# Patient Record
Sex: FEMALE | Race: WHITE | NOT HISPANIC OR LATINO | Employment: OTHER | URBAN - METROPOLITAN AREA
[De-identification: names, ages, dates, MRNs, and addresses within clinical notes are randomized per-mention and may not be internally consistent; named-entity substitution may affect disease eponyms.]

---

## 2017-03-18 ENCOUNTER — HOSPITAL ENCOUNTER (INPATIENT)
Facility: MEDICAL CENTER | Age: 82
LOS: 4 days | DRG: 417 | End: 2017-03-22
Attending: EMERGENCY MEDICINE | Admitting: INTERNAL MEDICINE
Payer: COMMERCIAL

## 2017-03-18 ENCOUNTER — APPOINTMENT (OUTPATIENT)
Dept: RADIOLOGY | Facility: MEDICAL CENTER | Age: 82
DRG: 417 | End: 2017-03-18
Attending: EMERGENCY MEDICINE
Payer: COMMERCIAL

## 2017-03-18 ENCOUNTER — RESOLUTE PROFESSIONAL BILLING HOSPITAL PROF FEE (OUTPATIENT)
Dept: HOSPITALIST | Facility: MEDICAL CENTER | Age: 82
End: 2017-03-18
Payer: COMMERCIAL

## 2017-03-18 DIAGNOSIS — K85.10 ACUTE GALLSTONE PANCREATITIS: ICD-10-CM

## 2017-03-18 PROBLEM — K80.50 CHOLEDOCHOLITHIASIS: Status: ACTIVE | Noted: 2017-03-18

## 2017-03-18 LAB
ALBUMIN SERPL BCP-MCNC: 4.8 G/DL (ref 3.2–4.9)
ALBUMIN/GLOB SERPL: 1.5 G/DL
ALP SERPL-CCNC: 103 U/L (ref 30–99)
ALT SERPL-CCNC: 218 U/L (ref 2–50)
ANION GAP SERPL CALC-SCNC: 12 MMOL/L (ref 0–11.9)
APPEARANCE UR: CLEAR
AST SERPL-CCNC: 414 U/L (ref 12–45)
BASOPHILS # BLD AUTO: 0.4 % (ref 0–1.8)
BASOPHILS # BLD: 0.06 K/UL (ref 0–0.12)
BILIRUB SERPL-MCNC: 1.5 MG/DL (ref 0.1–1.5)
BILIRUB UR QL STRIP.AUTO: NEGATIVE
BUN SERPL-MCNC: 34 MG/DL (ref 8–22)
CALCIUM SERPL-MCNC: 9.5 MG/DL (ref 8.4–10.2)
CHLORIDE SERPL-SCNC: 102 MMOL/L (ref 96–112)
CO2 SERPL-SCNC: 24 MMOL/L (ref 20–33)
COLOR UR: YELLOW
CREAT SERPL-MCNC: 1 MG/DL (ref 0.5–1.4)
CULTURE IF INDICATED INDCX: NO UA CULTURE
EOSINOPHIL # BLD AUTO: 0.03 K/UL (ref 0–0.51)
EOSINOPHIL NFR BLD: 0.2 % (ref 0–6.9)
ERYTHROCYTE [DISTWIDTH] IN BLOOD BY AUTOMATED COUNT: 40.6 FL (ref 35.9–50)
GFR SERPL CREATININE-BSD FRML MDRD: 53 ML/MIN/1.73 M 2
GLOBULIN SER CALC-MCNC: 3.2 G/DL (ref 1.9–3.5)
GLUCOSE SERPL-MCNC: 177 MG/DL (ref 65–99)
GLUCOSE UR STRIP.AUTO-MCNC: NEGATIVE MG/DL
HCT VFR BLD AUTO: 42.2 % (ref 37–47)
HGB BLD-MCNC: 14.5 G/DL (ref 12–16)
IMM GRANULOCYTES # BLD AUTO: 0.05 K/UL (ref 0–0.11)
IMM GRANULOCYTES NFR BLD AUTO: 0.3 % (ref 0–0.9)
KETONES UR STRIP.AUTO-MCNC: NEGATIVE MG/DL
LEUKOCYTE ESTERASE UR QL STRIP.AUTO: NEGATIVE
LIPASE SERPL-CCNC: >400 U/L (ref 7–58)
LYMPHOCYTES # BLD AUTO: 1.31 K/UL (ref 1–4.8)
LYMPHOCYTES NFR BLD: 8.9 % (ref 22–41)
MCH RBC QN AUTO: 29.7 PG (ref 27–33)
MCHC RBC AUTO-ENTMCNC: 34.4 G/DL (ref 33.6–35)
MCV RBC AUTO: 86.3 FL (ref 81.4–97.8)
MICRO URNS: NORMAL
MONOCYTES # BLD AUTO: 0.72 K/UL (ref 0–0.85)
MONOCYTES NFR BLD AUTO: 4.9 % (ref 0–13.4)
NEUTROPHILS # BLD AUTO: 12.61 K/UL (ref 2–7.15)
NEUTROPHILS NFR BLD: 85.3 % (ref 44–72)
NITRITE UR QL STRIP.AUTO: NEGATIVE
NRBC # BLD AUTO: 0 K/UL
NRBC BLD AUTO-RTO: 0 /100 WBC
PH UR STRIP.AUTO: 5 [PH]
PLATELET # BLD AUTO: 220 K/UL (ref 164–446)
PMV BLD AUTO: 9.8 FL (ref 9–12.9)
POTASSIUM SERPL-SCNC: 3.3 MMOL/L (ref 3.6–5.5)
PROT SERPL-MCNC: 8 G/DL (ref 6–8.2)
PROT UR QL STRIP: NEGATIVE MG/DL
RBC # BLD AUTO: 4.89 M/UL (ref 4.2–5.4)
RBC UR QL AUTO: NEGATIVE
SODIUM SERPL-SCNC: 138 MMOL/L (ref 135–145)
SP GR UR STRIP.AUTO: 1.02
WBC # BLD AUTO: 14.8 K/UL (ref 4.8–10.8)

## 2017-03-18 PROCEDURE — 99223 1ST HOSP IP/OBS HIGH 75: CPT | Performed by: INTERNAL MEDICINE

## 2017-03-18 PROCEDURE — 700111 HCHG RX REV CODE 636 W/ 250 OVERRIDE (IP): Performed by: INTERNAL MEDICINE

## 2017-03-18 PROCEDURE — 76705 ECHO EXAM OF ABDOMEN: CPT

## 2017-03-18 PROCEDURE — 99285 EMERGENCY DEPT VISIT HI MDM: CPT

## 2017-03-18 PROCEDURE — 83690 ASSAY OF LIPASE: CPT

## 2017-03-18 PROCEDURE — 80053 COMPREHEN METABOLIC PANEL: CPT

## 2017-03-18 PROCEDURE — 85025 COMPLETE CBC W/AUTO DIFF WBC: CPT

## 2017-03-18 PROCEDURE — 94760 N-INVAS EAR/PLS OXIMETRY 1: CPT

## 2017-03-18 PROCEDURE — 770006 HCHG ROOM/CARE - MED/SURG/GYN SEMI*

## 2017-03-18 PROCEDURE — 83036 HEMOGLOBIN GLYCOSYLATED A1C: CPT

## 2017-03-18 PROCEDURE — 700105 HCHG RX REV CODE 258: Performed by: EMERGENCY MEDICINE

## 2017-03-18 PROCEDURE — 96361 HYDRATE IV INFUSION ADD-ON: CPT

## 2017-03-18 PROCEDURE — 700111 HCHG RX REV CODE 636 W/ 250 OVERRIDE (IP): Performed by: EMERGENCY MEDICINE

## 2017-03-18 PROCEDURE — 700102 HCHG RX REV CODE 250 W/ 637 OVERRIDE(OP): Performed by: INTERNAL MEDICINE

## 2017-03-18 PROCEDURE — A9270 NON-COVERED ITEM OR SERVICE: HCPCS | Performed by: INTERNAL MEDICINE

## 2017-03-18 PROCEDURE — 700101 HCHG RX REV CODE 250: Performed by: INTERNAL MEDICINE

## 2017-03-18 PROCEDURE — 81003 URINALYSIS AUTO W/O SCOPE: CPT

## 2017-03-18 PROCEDURE — 96375 TX/PRO/DX INJ NEW DRUG ADDON: CPT

## 2017-03-18 PROCEDURE — 96374 THER/PROPH/DIAG INJ IV PUSH: CPT

## 2017-03-18 RX ORDER — ENALAPRILAT 1.25 MG/ML
1.25 INJECTION INTRAVENOUS EVERY 6 HOURS PRN
Status: DISCONTINUED | OUTPATIENT
Start: 2017-03-18 | End: 2017-03-22 | Stop reason: HOSPADM

## 2017-03-18 RX ORDER — HYDROCHLOROTHIAZIDE 25 MG/1
12.5 TABLET ORAL
Status: DISCONTINUED | OUTPATIENT
Start: 2017-03-19 | End: 2017-03-22 | Stop reason: HOSPADM

## 2017-03-18 RX ORDER — OXYCODONE HYDROCHLORIDE 5 MG/1
5 TABLET ORAL
Status: DISCONTINUED | OUTPATIENT
Start: 2017-03-18 | End: 2017-03-22 | Stop reason: HOSPADM

## 2017-03-18 RX ORDER — LORAZEPAM 2 MG/ML
0.5 INJECTION INTRAMUSCULAR EVERY 6 HOURS PRN
Status: DISCONTINUED | OUTPATIENT
Start: 2017-03-18 | End: 2017-03-22 | Stop reason: HOSPADM

## 2017-03-18 RX ORDER — LABETALOL HYDROCHLORIDE 5 MG/ML
20 INJECTION, SOLUTION INTRAVENOUS EVERY 4 HOURS PRN
Status: DISCONTINUED | OUTPATIENT
Start: 2017-03-18 | End: 2017-03-22 | Stop reason: HOSPADM

## 2017-03-18 RX ORDER — LOSARTAN POTASSIUM AND HYDROCHLOROTHIAZIDE 12.5; 5 MG/1; MG/1
1 TABLET ORAL DAILY
COMMUNITY

## 2017-03-18 RX ORDER — ACETAMINOPHEN 325 MG/1
650 TABLET ORAL EVERY 6 HOURS PRN
Status: DISCONTINUED | OUTPATIENT
Start: 2017-03-18 | End: 2017-03-22 | Stop reason: HOSPADM

## 2017-03-18 RX ORDER — LORAZEPAM 0.5 MG/1
0.5 TABLET ORAL EVERY 6 HOURS PRN
Status: DISCONTINUED | OUTPATIENT
Start: 2017-03-18 | End: 2017-03-22 | Stop reason: HOSPADM

## 2017-03-18 RX ORDER — ESOMEPRAZOLE MAGNESIUM 20 MG/1
1 TABLET, DELAYED RELEASE ORAL DAILY
COMMUNITY

## 2017-03-18 RX ORDER — ONDANSETRON 2 MG/ML
4 INJECTION INTRAMUSCULAR; INTRAVENOUS ONCE
Status: COMPLETED | OUTPATIENT
Start: 2017-03-18 | End: 2017-03-18

## 2017-03-18 RX ORDER — SODIUM CHLORIDE AND POTASSIUM CHLORIDE 150; 900 MG/100ML; MG/100ML
INJECTION, SOLUTION INTRAVENOUS CONTINUOUS
Status: DISCONTINUED | OUTPATIENT
Start: 2017-03-18 | End: 2017-03-19

## 2017-03-18 RX ORDER — OXYCODONE HYDROCHLORIDE 5 MG/1
2.5 TABLET ORAL
Status: DISCONTINUED | OUTPATIENT
Start: 2017-03-18 | End: 2017-03-22 | Stop reason: HOSPADM

## 2017-03-18 RX ORDER — MORPHINE SULFATE 4 MG/ML
2 INJECTION, SOLUTION INTRAMUSCULAR; INTRAVENOUS
Status: DISCONTINUED | OUTPATIENT
Start: 2017-03-18 | End: 2017-03-22 | Stop reason: HOSPADM

## 2017-03-18 RX ORDER — LOSARTAN POTASSIUM 25 MG/1
50 TABLET ORAL
Status: DISCONTINUED | OUTPATIENT
Start: 2017-03-19 | End: 2017-03-22 | Stop reason: HOSPADM

## 2017-03-18 RX ORDER — SODIUM CHLORIDE 9 MG/ML
1000 INJECTION, SOLUTION INTRAVENOUS ONCE
Status: COMPLETED | OUTPATIENT
Start: 2017-03-18 | End: 2017-03-18

## 2017-03-18 RX ORDER — FERROUS SULFATE 325(65) MG
325 TABLET ORAL EVERY EVENING
COMMUNITY

## 2017-03-18 RX ORDER — ONDANSETRON 4 MG/1
4 TABLET, ORALLY DISINTEGRATING ORAL EVERY 4 HOURS PRN
Status: DISCONTINUED | OUTPATIENT
Start: 2017-03-18 | End: 2017-03-22 | Stop reason: HOSPADM

## 2017-03-18 RX ORDER — FERROUS SULFATE 325(65) MG
325 TABLET ORAL EVERY EVENING
Status: DISCONTINUED | OUTPATIENT
Start: 2017-03-18 | End: 2017-03-22 | Stop reason: HOSPADM

## 2017-03-18 RX ORDER — LOSARTAN POTASSIUM AND HYDROCHLOROTHIAZIDE 12.5; 5 MG/1; MG/1
1 TABLET ORAL DAILY
Status: DISCONTINUED | OUTPATIENT
Start: 2017-03-18 | End: 2017-03-18

## 2017-03-18 RX ORDER — CIPROFLOXACIN 2 MG/ML
400 INJECTION, SOLUTION INTRAVENOUS EVERY 12 HOURS
Status: DISCONTINUED | OUTPATIENT
Start: 2017-03-18 | End: 2017-03-22 | Stop reason: HOSPADM

## 2017-03-18 RX ORDER — ONDANSETRON 2 MG/ML
4 INJECTION INTRAMUSCULAR; INTRAVENOUS EVERY 4 HOURS PRN
Status: DISCONTINUED | OUTPATIENT
Start: 2017-03-18 | End: 2017-03-22 | Stop reason: HOSPADM

## 2017-03-18 RX ORDER — OMEPRAZOLE 20 MG/1
20 CAPSULE, DELAYED RELEASE ORAL DAILY
Status: DISCONTINUED | OUTPATIENT
Start: 2017-03-19 | End: 2017-03-22 | Stop reason: HOSPADM

## 2017-03-18 RX ORDER — FEXOFENADINE HCL 60 MG/1
180 TABLET, FILM COATED ORAL DAILY
Status: DISCONTINUED | OUTPATIENT
Start: 2017-03-19 | End: 2017-03-22 | Stop reason: HOSPADM

## 2017-03-18 RX ORDER — MORPHINE SULFATE 4 MG/ML
2 INJECTION, SOLUTION INTRAMUSCULAR; INTRAVENOUS ONCE
Status: COMPLETED | OUTPATIENT
Start: 2017-03-18 | End: 2017-03-18

## 2017-03-18 RX ORDER — FEXOFENADINE HCL 180 MG/1
180 TABLET ORAL DAILY
COMMUNITY

## 2017-03-18 RX ORDER — FUROSEMIDE 20 MG/1
20 TABLET ORAL
COMMUNITY

## 2017-03-18 RX ADMIN — POTASSIUM CHLORIDE AND SODIUM CHLORIDE: 900; 150 INJECTION, SOLUTION INTRAVENOUS at 18:31

## 2017-03-18 RX ADMIN — ONDANSETRON 4 MG: 2 INJECTION, SOLUTION INTRAMUSCULAR; INTRAVENOUS at 15:09

## 2017-03-18 RX ADMIN — MORPHINE SULFATE 2 MG: 4 INJECTION INTRAVENOUS at 15:08

## 2017-03-18 RX ADMIN — FERROUS SULFATE TAB 325 MG (65 MG ELEMENTAL FE) 325 MG: 325 (65 FE) TAB at 23:07

## 2017-03-18 RX ADMIN — CIPROFLOXACIN 400 MG: 2 INJECTION, SOLUTION INTRAVENOUS at 19:29

## 2017-03-18 RX ADMIN — METRONIDAZOLE 500 MG: 500 INJECTION, SOLUTION INTRAVENOUS at 23:07

## 2017-03-18 RX ADMIN — SODIUM CHLORIDE 1000 ML: 9 INJECTION, SOLUTION INTRAVENOUS at 15:08

## 2017-03-18 RX ADMIN — METOPROLOL TARTRATE 12.5 MG: 25 TABLET ORAL at 23:07

## 2017-03-18 ASSESSMENT — LIFESTYLE VARIABLES
EVER_SMOKED: YES
EVER_SMOKED: YES
ALCOHOL_USE: NO

## 2017-03-18 ASSESSMENT — PAIN SCALES - GENERAL
PAINLEVEL_OUTOF10: 5
PAINLEVEL_OUTOF10: 10

## 2017-03-18 NOTE — IP AVS SNAPSHOT
" <p align=\"LEFT\"><IMG SRC=\"//[email or web address]$/[email or web address]\" alt=\"Image\" WIDTH=\"50%\" HEIGHT=\"200\" BORDER=\"\"></p>                   Name:Kathy Rosen  Medical Record Number:[id number]  CSN: [id number]    YOB: 1931   Age: 85 y.o.  Sex: female  HT:1.473 m (4' 9.99\") WT: 57.6 kg (126 lb 15.8 oz)          Admit Date: 3/18/2017     Discharge Date:   Today's Date: 3/22/2017  Attending Doctor:  Pavan Ascencio M.D.                  Allergies:  Penicillins          Follow-up Information     1. Follow up with Chrissie Patel M.D. In 1 week.    Specialty:  Surgery    Contact information    [address]  [address]  [facility] [address] [phone]           Medication List      Take these Medications        Instructions    ALLEGRA ALLERGY 180 MG tablet   Generic drug:  fexofenadine    Take 180 mg by mouth every day.   Dose:  180 mg       Esomeprazole Magnesium 20 MG Tbec    Take 1 Tab by mouth every day.   Dose:  1 Tab       ferrous sulfate 325 (65 FE) MG tablet    Take 325 mg by mouth every evening.   Dose:  325 mg       furosemide 20 MG Tabs   Commonly known as:  LASIX    Take 20 mg by mouth every 48 hours.   Dose:  20 mg       losartan-hydrochlorothiazide 50-12.5 MG per tablet   Commonly known as:  HYZAAR    Take 1 Tab by mouth every day.   Dose:  1 Tab       metoprolol 25 MG Tabs   Commonly known as:  LOPRESSOR    Take 12.5 mg by mouth 2 times a day.   Dose:  12.5 mg       ondansetron 4 MG Tbdp   Commonly known as:  ZOFRAN ODT    Take 1 Tab by mouth every four hours as needed for Nausea/Vomiting (give PO if IV route is unavailable. May give per feeding tube.).   Dose:  4 mg       oxycodone immediate-release 5 MG Tabs   Commonly known as:  ROXICODONE    Take 1 Tab by mouth every 6 hours as needed (Severe Pain (NRS Pain Scale 7-10; CPOT Pain Scale 6-8)).   Dose:  5 mg         "

## 2017-03-18 NOTE — ED NOTES
The Medication Reconciliation process has been completed by interviewing the patient    Allergies have been reviewed  Antibiotic use in 30 days - NONE    Home Pharmacy:  [facility] [place]

## 2017-03-18 NOTE — IP AVS SNAPSHOT
3/22/2017          Kathy Jessika  [address] [place] [address]    Dear Kathy:    [facility] wants to ensure your discharge home is safe and you or your loved ones have had all your questions answered regarding your care after you leave the hospital.    You may receive a telephone call within two days of your discharge.  This call is to make certain you understand your discharge instructions as well as ensure we provided you with the best care possible during your stay with us.     The call will only last approximately 3-5 minutes and will be done by a nurse.    Once again, we want to ensure your discharge home is safe and that you have a clear understanding of any next steps in your care.  If you have any questions or concerns, please do not hesitate to contact us, we are here for you.  Thank you for choosing [facility] for your healthcare needs.    Sincerely,    Manish Mills    [facility]

## 2017-03-18 NOTE — ED PROVIDER NOTES
"ED Provider Note    CHIEF COMPLAINT  Chief Complaint   Patient presents with   • Abdominal Pain   • N/V       HPI  Kathy Rosen is a 85 y.o. female who presents stating that she abruptly had onset of right upper quadrant and epigastric discomfort radiating to her back approximately 10 AM which has remained moderate in severity with no evidence of fever, chills, sweats, lower quadrant pain. She has had multiple episodes of nausea and vomiting. She last ate just before onset of pain. She denies any other complaints and has a history of appendectomy in the remote past but no other abdominal surgeries.    REVIEW OF SYSTEMS  See HPI for further details. All other systems are negative.     PAST MEDICAL HISTORY  Past Medical History   Diagnosis Date   • Hypertension    • GERD (gastroesophageal reflux disease)        FAMILY HISTORY  History reviewed. No pertinent family history.    SOCIAL HISTORY   reports that she has quit smoking. She has never used smokeless tobacco. She reports that she does not drink alcohol or use illicit drugs.    SURGICAL HISTORY  Past Surgical History   Procedure Laterality Date   • Appendectomy         CURRENT MEDICATIONS  Home Medications     Reviewed by Jayda Parham (Pharmacy Tech) on 03/18/17 at 1521  Med List Status: Complete    Medication Last Dose Status    Esomeprazole Magnesium 20 MG Tablet Delayed Response 3/18/2017 Active    ferrous sulfate 325 (65 FE) MG tablet 3/18/2017 Active    fexofenadine (ALLEGRA ALLERGY) 180 MG tablet 3/18/2017 Active    furosemide (LASIX) 20 MG Tab 3/17/2017 Active    losartan-hydrochlorothiazide (HYZAAR) 50-12.5 MG per tablet 3/18/2017 Active    metoprolol (LOPRESSOR) 25 MG Tab 3/18/2017 Active                ALLERGIES  Allergies   Allergen Reactions   • Penicillins Hives     Rxn - 2016       PHYSICAL EXAM  VITAL SIGNS: /67 mmHg  Pulse 77  Temp(Src) 37.1 °C (98.8 °F)  Resp 18  Ht 1.473 m (4' 10\")  Wt 57.6 kg (126 lb 15.8 oz)  BMI " 26.55 kg/m2  SpO2 96%   Constitutional: Well developed, Well nourished, No acute distress, Non-toxic appearance.   HENT: Normocephalic, Atraumatic, Bilateral external ears normal, Oropharynx is clear mucous membranes are moist. No oral exudates or nasal discharge.   Eyes: Pupils are equal round and reactive, EOMI, Conjunctiva normal, No discharge.   Neck: Normal range of motion, No tenderness, Supple, No stridor. No meningismus.  Lymphatic: No lymphadenopathy noted.   Cardiovascular: Regular rate and rhythm without murmur rub or gallop.  Thorax & Lungs: Clear breath sounds bilaterally without wheezes, rhonchi or rales. There is no chest wall tenderness.   Abdomen: Soft with tenderness of the right upper quadrant, positive Bose sign, the abdomen is otherwise scaphoid and non-distended. There is no rebound or guarding. No organomegaly is appreciated. Bowel sounds are normal.  Skin: Normal without rash.   Back: No CVA or spinal tenderness.   Extremities: Intact distal pulses, No edema, No tenderness, No cyanosis, No clubbing. Capillary refill is less than 2 seconds.  Musculoskeletal: Good range of motion in all major joints. No tenderness to palpation or major deformities noted.   Neurologic: Alert & oriented x 3, Normal motor function, Normal sensory function, No focal deficits noted. Reflexes are normal.  Psychiatric: Affect normal, Judgment normal, Mood normal. There is no suicidal ideation or patient reported hallucinations.       RADIOLOGY/PROCEDURES  US-GALLBLADDER   Final Result      1.  Cholelithiasis      2.  Biliary dilation which could indicate choledocholithiasis      3.  No other finding               COURSE & MEDICAL DECISION MAKING  Pertinent Labs & Imaging studies reviewed. (See chart for details)  Patient presents with significant right upper quadrant tenderness and was concerned about acute choledocholithiasis versus cholecystitis versus gastritis.    Laboratory evaluation reveals a normal  urinalysis with no evidence of infection. She does have elevation of white blood cell count 14,800 with 85% neutrophil shift. The patient's serum electrolytes show BUN elevated at 34 and I hydrated her with normal saline. She does have elevated liver enzymes and notably a lipase over 400 and clinically I think she has gallstone pancreatitis    I spoke Dr. Gutierrez and he will see the patient tomorrow morning for ERCP. I spoke with Dr. Peres who is admitting the patient in stable condition for pain control, management of symptoms and preparation for this procedure. Patient is informed of her status and her daughter translates to her in Israeli    FINAL IMPRESSION  1. Acute gallstone pancreatitis             Electronically signed by: Guzman Charles, 3/18/2017 4:04 PM

## 2017-03-18 NOTE — IP AVS SNAPSHOT
" Home Care Instructions                                                                                                                  Name:Kathy Rosen  Medical Record Number:[id number]  CSN: [id number]    YOB: 1931   Age: 85 y.o.  Sex: female  HT:1.473 m (4' 9.99\") WT: 57.6 kg (126 lb 15.8 oz)          Admit Date: 3/18/2017     Discharge Date:   Today's Date: 3/22/2017  Attending Doctor:  Pavan Ascencio M.D.                  Allergies:  Penicillins            Discharge Instructions       Discharge Instructions    Discharged to home by car with relative. Discharged via wheelchair, hospital escort: Yes.  Special equipment needed: Not Applicable    Be sure to schedule a follow-up appointment with your primary care doctor or any specialists as instructed.     Discharge Plan:   Influenza Vaccine Indication: Not indicated: Previously immunized this influenza season and > 8 years of age    I understand that a diet low in cholesterol, fat, and sodium is recommended for good health. Unless I have been given specific instructions below for another diet, I accept this instruction as my diet prescription.     Special Instructions:     Laparoscopic Cholecystectomy discharge instructions:     DIET: Upon discharge from the hospital you may resume your normal preoperative diet. Depending on how you are feeling and whether you have nausea or not, you may wish to stay with a bland diet for the first few days. However, you can advance this as quickly as you feel ready.     ACTIVITIES: After discharge from the hospital, you may resume full routine activities. However, there should be no heavy lifting (greater than 15 pounds) and no strenuous activities until after your follow-up visit. Otherwise, routine activities of daily living are acceptable.     DRIVING: You may drive whenever you are off pain medications and are able to perform the activities needed to drive, i.e. turning, bending, twisting, etc.     BATHING: You " may get the wound wet at any time after leaving the hospital. You may shower, but do not submerge in a bath for at least a week. Dressings may come off after 48 hours.     BOWEL FUNCTION: A few patients, after this operation, will develop either frequent or loose stools after meals. This usually corrects itself after a few days, to a few weeks. If this occurs, do not worry; it is not unusual and will resolve. Much more common than loose stools, is constipation. The combination of pain medication and decreased activity level can cause constipation in otherwise normal patients. If you feel this is occurring, take a laxative (Milk of Magnesia, Ex-Lax, Senokot, etc.) until the problem has resolved.     PAIN MEDICATION: You will be given a prescription for pain medication at discharge. Please take these as directed. It is important to remember not to take medications on an empty stomach as this may cause nausea.     CALL IF YOU HAVE: (1) Fevers to more than 1010 F, (2) Unusual chest or leg pain, (3) Drainage or fluid from incision that may be foul smelling, increased tenderness or soreness at the wound or the wound edges are no longer together, redness or swelling at the incision site. Please do not hesitate to call with any other questions.     APPOINTMENT: Contact our office at [phone] for a follow-up appointment in 1 to 2 weeks following your procedure.     If you have any additional questions, please do not hesitate to call the office.     Office address:   [address] [place] [address], [address] [place] [address]    · Is patient discharged on Warfarin / Coumadin?   No     · Is patient Post Blood Transfusion?  No    Depression / Suicide Risk    As you are discharged from this [facility] facility, it is important to learn how to keep safe from harming yourself.    Recognize the warning signs:  · Abrupt changes in personality, positive or negative- including increase in energy   · Giving away possessions  · Change in eating  patterns- significant weight changes-  positive or negative  · Change in sleeping patterns- unable to sleep or sleeping all the time   · Unwillingness or inability to communicate  · Depression  · Unusual sadness, discouragement and loneliness  · Talk of wanting to die  · Neglect of personal appearance   · Rebelliousness- reckless behavior  · Withdrawal from people/activities they love  · Confusion- inability to concentrate     If you or a loved one observes any of these behaviors or has concerns about self-harm, here's what you can do:  · Talk about it- your feelings and reasons for harming yourself  · Remove any means that you might use to hurt yourself (examples: pills, rope, extension cords, firearm)  · Get professional help from the community (Mental Health, Substance Abuse, psychological counseling)  · Do not be alone:Call your Safe Contact- someone whom you trust who will be there for you.  · Call your local CRISIS HOTLINE 013-9215 or 309-076-8473  · Call your local Children's Mobile Crisis Response Team [place] (860) 049-8685 or wwwBriteseed  · Call the toll free National Suicide Prevention Hotlines   · National Suicide Prevention Lifeline 356-187-BFIM (8393)  · National Hope Line Network 800-SUICIDE (881-7023)        Follow-up Information     1. Follow up with Chrissie Patel M.D. In 1 week.    Specialty:  Surgery    Contact information    [address]  [address]  [place] [address] [phone]           Discharge Medication Instructions:    Below are the medications your physician expects you to take upon discharge:    Review all your home medications and newly ordered medications with your doctor and/or pharmacist. Follow medication instructions as directed by your doctor and/or pharmacist.    Please keep your medication list with you and share with your physician.               Medication List      START taking these medications        Instructions    ondansetron 4 MG Tbdp   Commonly  known as:  ZOFRAN ODT    Take 1 Tab by mouth every four hours as needed for Nausea/Vomiting (give PO if IV route is unavailable. May give per feeding tube.).   Dose:  4 mg       oxycodone immediate-release 5 MG Tabs   Commonly known as:  ROXICODONE    Take 1 Tab by mouth every 6 hours as needed (Severe Pain (NRS Pain Scale 7-10; CPOT Pain Scale 6-8)).   Dose:  5 mg         CONTINUE taking these medications        Instructions    ALLEGRA ALLERGY 180 MG tablet   Last time this was given:  180 mg on 3/22/2017  8:53 AM   Generic drug:  fexofenadine    Take 180 mg by mouth every day.   Dose:  180 mg       Esomeprazole Magnesium 20 MG Tbec    Take 1 Tab by mouth every day.   Dose:  1 Tab       ferrous sulfate 325 (65 FE) MG tablet   Last time this was given:  325 mg on 3/21/2017  8:00 PM    Take 325 mg by mouth every evening.   Dose:  325 mg       furosemide 20 MG Tabs   Commonly known as:  LASIX    Take 20 mg by mouth every 48 hours.   Dose:  20 mg       losartan-hydrochlorothiazide 50-12.5 MG per tablet   Commonly known as:  HYZAAR    Take 1 Tab by mouth every day.   Dose:  1 Tab       metoprolol 25 MG Tabs   Last time this was given:  12.5 mg on 3/22/2017  8:54 AM   Commonly known as:  LOPRESSOR    Take 12.5 mg by mouth 2 times a day.   Dose:  12.5 mg               Instructions           Diet / Nutrition:    Follow any diet instructions given to you by your doctor or the dietician, including how much salt (sodium) you are allowed each day.    If you are overweight, talk to your doctor about a weight reduction plan.    Activity:    Remain physically active following your doctor's instructions about exercise and activity.    Rest often.     Any time you become even a little tired or short of breath, SIT DOWN and rest.    Worsening Symptoms:    Report any of the following signs and symptoms to the doctor's office immediately:    *Pain of jaw, arm, or neck  *Chest pain not relieved by medication                                *Dizziness or loss of consciousness  *Difficulty breathing even when at rest   *More tired than usual                                       *Bleeding drainage or swelling of surgical site  *Swelling of feet, ankles, legs or stomach                 *Fever (>100ºF)  *Pink or blood tinged sputum  *Weight gain (3lbs/day or 5lbs /week)           *Shock from internal defibrillator (if applicable)  *Palpitations or irregular heartbeats                *Cool and/or numb extremities    Stroke Awareness    Common Risk Factors for Stroke include:    Age  Atrial Fibrillation  Carotid Artery Stenosis  Diabetes Mellitus  Excessive alcohol consumption  High blood pressure  Overweight   Physical inactivity  Smoking    Warning signs and symptoms of a stroke include:    *Sudden numbness or weakness of the face, arm or leg (especially on one side of the body).  *Sudden confusion, trouble speaking or understanding.  *Sudden trouble seeing in one or both eyes.  *Sudden trouble walking, dizziness, loss of balance or coordination.Sudden severe headache with no known cause.    It is very important to get treatment quickly when a stroke occurs. If you experience any of the above warning signs, call 911 immediately.                   Disclaimer         Quit Smoking / Tobacco Use:    I understand the use of any tobacco products increases my chance of suffering from future heart disease or stroke and could cause other illnesses which may shorten my life. Quitting the use of tobacco products is the single most important thing I can do to improve my health. For further information on smoking / tobacco cessation call a Toll Free Quit Line at 1-298.632.3900 (*National Cancer Likely) or 1-149.231.5938 (American Lung Association) or you can access the web based program at www.lungusa.org.    [place] Tobacco Users Help Line:  (928) 613-8985       Toll Free: 1-461.134.1740  Quit Tobacco Program [facility]  (252) 209-4709    Crisis Hotline:    [place] Crisis Hotline:  1-939-ULDWAWY or 1-620.285.7595    [place] Crisis Hotline:    1-810.647.5007 or 519-673-3069    Discharge Survey:   Thank you for choosing [facility]. We hope we did everything we could to make your hospital stay a pleasant one. You may be receiving a phone survey and we would appreciate your time and participation in answering the questions. Your input is very valuable to us in our efforts to improve our service to our patients and their families.        My signature on this form indicates that:    1. I have reviewed and understand the above information.  2. My questions regarding this information have been answered to my satisfaction.  3. I have formulated a plan with my discharge nurse to obtain my prescribed medications for home.                  Disclaimer         __________________________________                     __________       ________                       Patient Signature                                                 Date                    Time

## 2017-03-18 NOTE — IP AVS SNAPSHOT
Solaicx Access Code: [email or web address]  Expires: 4/21/2017 10:20 AM    Your email address is not on file at [employer] [employer].  Email Addresses are required for you to sign up for Solaicx, please contact [phone] to verify your personal information and to provide your email address prior to attempting to register for Solaicx.    Kathy Rosen  [address] [place] [address]    Solaicx  A secure, online tool to manage your health information     [employer]’s Solaicx® is a secure, online tool that connects you to your personalized health information from the privacy of your home -- day or night - making it very easy for you to manage your healthcare. Once the activation process is completed, you can even access your medical information using the Solaicx faraz, which is available for free in the Apple Faraz store or Google Play store.     To learn more about Solaicx, visit [email or web address]    There are two levels of access available (as shown below):   My Chart Features  [facility] Primary Care Doctor [facility]  Specialists [facility]  Urgent  Care Non-[facility] Primary Care Doctor   Email your healthcare team securely and privately 24/7 X X X    Manage appointments: schedule your next appointment; view details of past/upcoming appointments X      Request prescription refills. X      View recent personal medical records, including lab and immunizations X X X X   View health record, including health history, allergies, medications X X X X   Read reports about your outpatient visits, procedures, consult and ER notes X X X X   See your discharge summary, which is a recap of your hospital and/or ER visit that includes your diagnosis, lab results, and care plan X X  X     How to register for The Point[employer]:  Once your e-mail address has been verified, follow the following steps to sign up for Solaicx.     1. Go to  [email or web address]  2. Click on the Sign Up Now box, which takes you to the New Member Sign Up page. You  will need to provide the following information:  a. Enter your [employer] Access Code exactly as it appears at the top of this page. (You will not need to use this code after you’ve completed the sign-up process. If you do not sign up before the expiration date, you must request a new code.)   b. Enter your date of birth.   c. Enter your home email address.   d. Click Submit, and follow the next screen’s instructions.  3. Create a [employer] ID. This will be your [employer] login ID and cannot be changed, so think of one that is secure and easy to remember.  4. Create a [employer] password. You can change your password at any time.  5. Enter your Password Reset Question and Answer. This can be used at a later time if you forget your password.   6. Enter your e-mail address. This allows you to receive e-mail notifications when new information is available in [employer]  7. Click Sign Up. You can now view your health information.    For assistance activating your [employer] account, call [phone]

## 2017-03-19 LAB
ALBUMIN SERPL BCP-MCNC: 3.2 G/DL (ref 3.2–4.9)
ALBUMIN/GLOB SERPL: 1.3 G/DL
ALP SERPL-CCNC: 77 U/L (ref 30–99)
ALT SERPL-CCNC: 155 U/L (ref 2–50)
ANION GAP SERPL CALC-SCNC: 6 MMOL/L (ref 0–11.9)
AST SERPL-CCNC: 112 U/L (ref 12–45)
BASOPHILS # BLD AUTO: 0.2 % (ref 0–1.8)
BASOPHILS # BLD: 0.02 K/UL (ref 0–0.12)
BILIRUB SERPL-MCNC: 0.7 MG/DL (ref 0.1–1.5)
BUN SERPL-MCNC: 20 MG/DL (ref 8–22)
CALCIUM SERPL-MCNC: 8.1 MG/DL (ref 8.4–10.2)
CHLORIDE SERPL-SCNC: 111 MMOL/L (ref 96–112)
CO2 SERPL-SCNC: 25 MMOL/L (ref 20–33)
CREAT SERPL-MCNC: 0.87 MG/DL (ref 0.5–1.4)
EOSINOPHIL # BLD AUTO: 0.05 K/UL (ref 0–0.51)
EOSINOPHIL NFR BLD: 0.5 % (ref 0–6.9)
ERYTHROCYTE [DISTWIDTH] IN BLOOD BY AUTOMATED COUNT: 43.6 FL (ref 35.9–50)
EST. AVERAGE GLUCOSE BLD GHB EST-MCNC: 105 MG/DL
GFR SERPL CREATININE-BSD FRML MDRD: >60 ML/MIN/1.73 M 2
GLOBULIN SER CALC-MCNC: 2.5 G/DL (ref 1.9–3.5)
GLUCOSE SERPL-MCNC: 94 MG/DL (ref 65–99)
HBA1C MFR BLD: 5.3 % (ref 0–5.6)
HCT VFR BLD AUTO: 36 % (ref 37–47)
HGB BLD-MCNC: 12.2 G/DL (ref 12–16)
IMM GRANULOCYTES # BLD AUTO: 0.04 K/UL (ref 0–0.11)
IMM GRANULOCYTES NFR BLD AUTO: 0.4 % (ref 0–0.9)
INR PPP: 1.1 (ref 0.87–1.13)
LIPASE SERPL-CCNC: >400 U/L (ref 7–58)
LYMPHOCYTES # BLD AUTO: 2.35 K/UL (ref 1–4.8)
LYMPHOCYTES NFR BLD: 24 % (ref 22–41)
MCH RBC QN AUTO: 30.1 PG (ref 27–33)
MCHC RBC AUTO-ENTMCNC: 33.9 G/DL (ref 33.6–35)
MCV RBC AUTO: 88.9 FL (ref 81.4–97.8)
MONOCYTES # BLD AUTO: 0.6 K/UL (ref 0–0.85)
MONOCYTES NFR BLD AUTO: 6.1 % (ref 0–13.4)
NEUTROPHILS # BLD AUTO: 6.73 K/UL (ref 2–7.15)
NEUTROPHILS NFR BLD: 68.8 % (ref 44–72)
NRBC # BLD AUTO: 0 K/UL
NRBC BLD AUTO-RTO: 0 /100 WBC
PLATELET # BLD AUTO: 191 K/UL (ref 164–446)
PMV BLD AUTO: 10.3 FL (ref 9–12.9)
POTASSIUM SERPL-SCNC: 4.1 MMOL/L (ref 3.6–5.5)
PROT SERPL-MCNC: 5.7 G/DL (ref 6–8.2)
PROTHROMBIN TIME: 14 SEC (ref 12–14.6)
RBC # BLD AUTO: 4.05 M/UL (ref 4.2–5.4)
SODIUM SERPL-SCNC: 142 MMOL/L (ref 135–145)
WBC # BLD AUTO: 9.8 K/UL (ref 4.8–10.8)

## 2017-03-19 PROCEDURE — 85025 COMPLETE CBC W/AUTO DIFF WBC: CPT

## 2017-03-19 PROCEDURE — 770006 HCHG ROOM/CARE - MED/SURG/GYN SEMI*

## 2017-03-19 PROCEDURE — 700101 HCHG RX REV CODE 250: Performed by: INTERNAL MEDICINE

## 2017-03-19 PROCEDURE — 85610 PROTHROMBIN TIME: CPT

## 2017-03-19 PROCEDURE — 99233 SBSQ HOSP IP/OBS HIGH 50: CPT | Performed by: INTERNAL MEDICINE

## 2017-03-19 PROCEDURE — 83690 ASSAY OF LIPASE: CPT

## 2017-03-19 PROCEDURE — 700111 HCHG RX REV CODE 636 W/ 250 OVERRIDE (IP): Performed by: INTERNAL MEDICINE

## 2017-03-19 PROCEDURE — A9270 NON-COVERED ITEM OR SERVICE: HCPCS | Performed by: INTERNAL MEDICINE

## 2017-03-19 PROCEDURE — 80053 COMPREHEN METABOLIC PANEL: CPT

## 2017-03-19 PROCEDURE — 700102 HCHG RX REV CODE 250 W/ 637 OVERRIDE(OP): Performed by: INTERNAL MEDICINE

## 2017-03-19 PROCEDURE — 36415 COLL VENOUS BLD VENIPUNCTURE: CPT

## 2017-03-19 RX ORDER — BUDESONIDE AND FORMOTEROL FUMARATE DIHYDRATE 80; 4.5 UG/1; UG/1
2 AEROSOL RESPIRATORY (INHALATION)
Status: DISCONTINUED | OUTPATIENT
Start: 2017-03-19 | End: 2017-03-19

## 2017-03-19 RX ORDER — DEXTROSE AND SODIUM CHLORIDE 5; .45 G/100ML; G/100ML
INJECTION, SOLUTION INTRAVENOUS CONTINUOUS
Status: DISCONTINUED | OUTPATIENT
Start: 2017-03-19 | End: 2017-03-20

## 2017-03-19 RX ORDER — BUDESONIDE AND FORMOTEROL FUMARATE DIHYDRATE 80; 4.5 UG/1; UG/1
2 AEROSOL RESPIRATORY (INHALATION) 2 TIMES DAILY
Status: DISCONTINUED | OUTPATIENT
Start: 2017-03-19 | End: 2017-03-22 | Stop reason: HOSPADM

## 2017-03-19 RX ADMIN — FERROUS SULFATE TAB 325 MG (65 MG ELEMENTAL FE) 325 MG: 325 (65 FE) TAB at 21:34

## 2017-03-19 RX ADMIN — METRONIDAZOLE 500 MG: 500 INJECTION, SOLUTION INTRAVENOUS at 06:27

## 2017-03-19 RX ADMIN — DEXTROSE AND SODIUM CHLORIDE: 5; .45 INJECTION, SOLUTION INTRAVENOUS at 22:54

## 2017-03-19 RX ADMIN — DEXTROSE AND SODIUM CHLORIDE: 5; .45 INJECTION, SOLUTION INTRAVENOUS at 08:35

## 2017-03-19 RX ADMIN — POTASSIUM CHLORIDE AND SODIUM CHLORIDE: 900; 150 INJECTION, SOLUTION INTRAVENOUS at 05:06

## 2017-03-19 RX ADMIN — METOPROLOL TARTRATE 12.5 MG: 25 TABLET ORAL at 21:34

## 2017-03-19 RX ADMIN — METRONIDAZOLE 500 MG: 500 INJECTION, SOLUTION INTRAVENOUS at 22:50

## 2017-03-19 RX ADMIN — METRONIDAZOLE 500 MG: 500 INJECTION, SOLUTION INTRAVENOUS at 14:10

## 2017-03-19 RX ADMIN — CIPROFLOXACIN 400 MG: 2 INJECTION, SOLUTION INTRAVENOUS at 21:35

## 2017-03-19 RX ADMIN — METOPROLOL TARTRATE 12.5 MG: 25 TABLET ORAL at 08:32

## 2017-03-19 RX ADMIN — CIPROFLOXACIN 400 MG: 2 INJECTION, SOLUTION INTRAVENOUS at 08:32

## 2017-03-19 ASSESSMENT — ENCOUNTER SYMPTOMS
FEVER: 0
ABDOMINAL PAIN: 0
NAUSEA: 0
HEADACHES: 0
SHORTNESS OF BREATH: 0

## 2017-03-19 ASSESSMENT — LIFESTYLE VARIABLES: EVER_SMOKED: YES

## 2017-03-19 ASSESSMENT — COPD QUESTIONNAIRES
DO YOU EVER COUGH UP ANY MUCUS OR PHLEGM?: NO/ONLY WITH OCCASIONAL COLDS OR INFECTIONS
HAVE YOU SMOKED AT LEAST 100 CIGARETTES IN YOUR ENTIRE LIFE: YES
DURING THE PAST 4 WEEKS HOW MUCH DID YOU FEEL SHORT OF BREATH: NONE/LITTLE OF THE TIME
COPD SCREENING SCORE: 4

## 2017-03-19 ASSESSMENT — PAIN SCALES - GENERAL: PAINLEVEL_OUTOF10: 0

## 2017-03-19 NOTE — ED NOTES
Pt AAOx4 with complaints of abd pain. No reports of difficulty breathing or SOB. No signs of distress or discomfort. Ambulatory with no assistance. Gurney in lowest position, locked, and call light within reach. Daughter at bedside

## 2017-03-19 NOTE — CARE PLAN
Problem: Communication  Goal: The ability to communicate needs accurately and effectively will improve  Outcome: PROGRESSING AS EXPECTED  Translation farhana in use for simple communication when daughter is not BS. Pts daughter otherwise provides translation.    Problem: Knowledge Deficit  Goal: Knowledge of disease process/condition, treatment plan, diagnostic tests, and medications will improve  Outcome: PROGRESSING AS EXPECTED  Discussed POC for shift. Discussed disease process and treatments in place/ordered.Pt verbalized understanding. All questions answered at this time.

## 2017-03-19 NOTE — PROGRESS NOTES
3/18/17    1900    Report taken, assumed care of Pt. Pt speaks only Irish, translation farhana used for communication Pt denies pain or discomfort at this time.  Call light and possessions within reach. Pt denies needs at this time. Will continue to monitor.    2100    Pts daughter now BS, will stay the night. Able to use daughter for translation and review POC for the evening. All questions answered, pt continues to deny pain/discomfort or needs. Wctm.

## 2017-03-19 NOTE — H&P
CHIEF COMPLAINT:  Abdominal pain, nausea and vomiting.    HISTORY OF PRESENT ILLNESS:  This is an 85-year-old female with past medical   history of hypertension.  She is [place] speaking, and RN and patient's   daughter provide translation.  Patient was feeling well last night, ate dinner   as usual and went to bed.  When she woke up this morning, she did not take   breakfast because she still felt bloated and like her food was undigested from   the night before.  Per her daughter, this is not particularly unusual for the   patient.  Shortly thereafter however, patient began to complain of epigastric   pain, which then started to localize to the right upper quadrant and became   very nauseated and vomited up all her supper from the night before.  The   daughter said it looked like it was undigested.  The patient felt somewhat   better after vomiting.  Her pain seemed to be slightly better as well.  The   daughter gave her a small sip of water, which prompted the patient to vomit   again.  She then gave the patient Zofran oral disintegrating tablet and the   patient vomited again.  She started having worsening abdominal pain along with   her continued nausea, was quite ill appearing, so the daughter brought her in   here.    PAST MEDICAL HISTORY:  Significant for hypertension.    PAST SURGICAL HISTORY:  She had an appendectomy in the 1950s.    FAMILY MEDICAL HISTORY:  Her father had stroke.  Her mother possibly had   pancreatic cancer, but it was never confirmed.    REVIEW OF SYSTEMS:  She has had no fevers, no chills, no sweats, no dizziness,   no shortness of breath except with the pain.  No chest pain.  Abdominal pain   in the epigastric right upper quadrant area.  No prior similar pain, really no   exacerbating or relieving factors other than when she ate, seemed to the   nausea worse.  Denies any coffee-ground emesis or hematemesis.  She had a   small bowel movement, which was reported as normal this morning.   No dysuria,   no recent diarrhea.  No headache.    SOCIAL HISTORY:  Patient lives with her daughter here in [place].  She has   another daughter who lives in [place].  Patient is .  She does not smoke   or drink.    CODE STATUS:  Full code.    PHYSICAL EXAMINATION:  VITAL SIGNS:  She is afebrile, temperature 37.1, blood pressure 140/67, pulse   77, respirations 16, saturating 96% on room air.  GENERAL:  This is a very pleasant, well-appearing  female, nontoxic,   no acute distress.  HEENT:  Pupils equal, round and reactive.  Extraocular movements are intact.    Oropharynx is clear and moist.  NECK:  Supple.  LUNGS:  Clear in all fields.  HEART:  Regular rate and rhythm without appreciable murmurs, rubs, or gallops.  ABDOMEN:  Reveals normal bowel sounds.  She is soft, very stoic.  She does not   admit to any tenderness nor does she grimace, but when asked specifically she   admits that it was tender in the right upper quadrant area.  There is no   rebound, guarding, mass or Bose sign at this time.  The patient is not   jaundiced.  There is no scleral icterus.  NEUROLOGIC:  She is grossly intact.    LABORATORY STUDIES:  CBC shows a white count of 14.8.  Remaining parameters of   the blood count are normal.  Her differential shows 85.3% polys, no bandemia,   no immature granulocytosis.  Her chemistry profile, sodium 138, potassium   3.3, chloride 102, CO2 24, glucose 177, BUN 34, creatinine 1, SGOT is 414,   SGPT is 218, alkaline phosphatase is 103, bilirubin is 1.5.  Lipase is greater   than 400.  Urinalysis is clear.  Ultrasound shows dilated common duct and   multiple gallstones.  There is no gallbladder wall thickening, no   pericholecystic fluid.    ASSESSMENT:  1.  Abdominal pain, sonographic findings and lab work, possibly consistent   with choledocholithiasis.  Dr. Fernandes has been consulted and planning for   ERCP tomorrow.  2.  Gallstone pancreatitis.  Patient will be n.p.o.  We will monitor  her   lipase.  3.  Cholelithiasis.  Patient will probably require a cholecystectomy prior to   discharge.  We will wait for pancreatic enzymes to normalize and then surgery   can be consulted once she has had the ERCP provided this is improved.  4.  Hyperglycemia.  Patient has no known history of diabetes.  It may be just   secondary to acute stress and infection.  We will get hemoglobin A1c and   proceed from that point.  She is n.p.o. now so it does not matter in terms of   diet and it is not high enough to warrant initiating a sliding scale at this   time.  5.  Hypertension.  We will continue her home medications.  6.  Hypokalemia.  We will put potassium in her IV fluids.  Dr. Fernandes has   been called by the emergency room physician and will be consulting on the   patient.  Procedure ERCP was explained to the daughter and patient has also   generalized plan of care.  All questions were answered at the time of   admission.       ____________________________________     MD KIRK CIFUENTES / ALBERTO    DD:  03/18/2017 18:19:49  DT:  03/18/2017 18:42:18    D#:  [id number]  Job#:  [id number]

## 2017-03-19 NOTE — CONSULTS
DATE OF SERVICE:  03/19/2017    REASON FOR CONSULTATION:  Gallstone pancreatitis.    REQUESTING PHYSICIAN:  Samuel Ray DO    HISTORY OF PRESENT ILLNESS:  This is an 85-year-old female admitted yesterday   with abdominal pain.    History is obtained in part from the patient, nursing staff, and daughter by   phone.  She was in her usual state of health until the night before admission   when she developed sudden onset of severe epigastric abdominal pain with   associated nausea and vomiting.  Her pain improved somewhat post-emesis.    Later in the day, her pain became more severe and she was brought to the ER   for further evaluation.  She was found to have evidence of pancreatitis.    This is the patient's first episode of pancreatitis.  She does not consume   alcohol.    REVIEW OF SYSTEMS:  PSYCHIATRIC:  No anxiety or depression.  MUSCULOSKELETAL:  No joint pain, swelling, stiffness.  DERMATOLOGIC:  No rash.  GASTROINTESTINAL:  As above.  CARDIOVASCULAR:  No chest pain, palpitation, shortness of breath.  PULMONARY:  No cough or wheezing.  ALLERGY AND IMMUNOLOGY:  No hay fever.  ENDOCRINE:  Denies polyuria or polydipsia.  CONSTITUTIONAL:  No fevers, chills, weight loss.    PAST MEDICAL HISTORY:  Hypertension.    PAST SURGICAL HISTORY:  Appendectomy.    OUTPATIENT MEDICATIONS:  Antihypertensive.    ALLERGIES:  PENICILLIN.    SOCIAL HISTORY:  The patient lives with her daughter in [place].  She is   .  She does not smoke, drink alcohol nor use illicit drugs.    PHYSICAL EXAMINATION:  VITAL SIGNS:  Temperature is 36.7, pulse 75, respirations 16, /53.  GENERAL APPEARANCE:  Elderly otherwise well-appearing female in no acute   distress.  She is alert and oriented x3.  HEENT:  Sclerae are anicteric.  Oropharynx is moist.  NECK:  Supple.  No adenopathy.  HEART:  Regular rate and rhythm.  LUNGS:  Clear to auscultation.  ABDOMEN:  Flat, soft, positive bowel sounds, minimal epigastric tenderness.    There is  no guarding, rebound or rigidity.  EXTREMITIES:  Warm and dry without clubbing, cyanosis or edema.    LABORATORY DATA:  White count 9, hematocrit 36, platelet count 191,000.    Sodium 142, potassium 4.1, chloride 111, bicarbonate 25, BUN 20, creatinine   0.8, , , alkaline phosphatase 77, bilirubin 1.5, lipase greater   than 400, INR 1.1.  Abdominal ultrasound shows cholelithiasis, dilated common   bile duct to 9 mm.    ASSESSMENT AND PLAN:  1.  An 85-year-old female with acute gallstone pancreatitis.  She is   clinically stable.  The pain has nearly resolved.  Plan, conservative therapy   with n.p.o. status and IV fluids with parental analgesics as needed.  2.  Probable choledocholithiasis.  The patient shows dilated common bile duct   and abnormal liver tests consistent with common bile duct obstruction.  Plan,   we will proceed with ERCP with sphincterotomy and stone extraction.  Informed   consent was obtained after explanation of risks, benefits, and alternatives.  3.  Cholelithiasis without evidence of cholecystitis.  Plan, she is to undergo   a laparoscopic cholecystectomy post-ERCP.       ____________________________________     VESTA WANG DO    PIS / NTS    DD:  03/19/2017 11:34:12  DT:  03/19/2017 16:38:12    D#:  [id number]  Job#:  [id number]

## 2017-03-19 NOTE — PROGRESS NOTES
Pt seen and examined  Has cholelithiasis and choledocholithiasis  Plan ERCP today  Plan lap belén tomorrow

## 2017-03-20 ENCOUNTER — APPOINTMENT (OUTPATIENT)
Dept: RADIOLOGY | Facility: MEDICAL CENTER | Age: 82
DRG: 417 | End: 2017-03-20
Attending: INTERNAL MEDICINE
Payer: COMMERCIAL

## 2017-03-20 LAB
ALBUMIN SERPL BCP-MCNC: 3.1 G/DL (ref 3.2–4.9)
ALBUMIN/GLOB SERPL: 1.2 G/DL
ALP SERPL-CCNC: 64 U/L (ref 30–99)
ALT SERPL-CCNC: 90 U/L (ref 2–50)
ANION GAP SERPL CALC-SCNC: 6 MMOL/L (ref 0–11.9)
AST SERPL-CCNC: 38 U/L (ref 12–45)
BASOPHILS # BLD AUTO: 0.6 % (ref 0–1.8)
BASOPHILS # BLD: 0.05 K/UL (ref 0–0.12)
BILIRUB SERPL-MCNC: 0.6 MG/DL (ref 0.1–1.5)
BUN SERPL-MCNC: 6 MG/DL (ref 8–22)
CALCIUM SERPL-MCNC: 8.1 MG/DL (ref 8.4–10.2)
CHLORIDE SERPL-SCNC: 111 MMOL/L (ref 96–112)
CO2 SERPL-SCNC: 21 MMOL/L (ref 20–33)
CREAT SERPL-MCNC: 0.69 MG/DL (ref 0.5–1.4)
EKG IMPRESSION: NORMAL
EOSINOPHIL # BLD AUTO: 0.2 K/UL (ref 0–0.51)
EOSINOPHIL NFR BLD: 2.3 % (ref 0–6.9)
ERYTHROCYTE [DISTWIDTH] IN BLOOD BY AUTOMATED COUNT: 43.3 FL (ref 35.9–50)
GFR SERPL CREATININE-BSD FRML MDRD: >60 ML/MIN/1.73 M 2
GLOBULIN SER CALC-MCNC: 2.5 G/DL (ref 1.9–3.5)
GLUCOSE SERPL-MCNC: 110 MG/DL (ref 65–99)
HCT VFR BLD AUTO: 35.1 % (ref 37–47)
HGB BLD-MCNC: 11.5 G/DL (ref 12–16)
IMM GRANULOCYTES # BLD AUTO: 0.03 K/UL (ref 0–0.11)
IMM GRANULOCYTES NFR BLD AUTO: 0.3 % (ref 0–0.9)
LIPASE SERPL-CCNC: 51 U/L (ref 7–58)
LYMPHOCYTES # BLD AUTO: 3.08 K/UL (ref 1–4.8)
LYMPHOCYTES NFR BLD: 34.8 % (ref 22–41)
MCH RBC QN AUTO: 29.2 PG (ref 27–33)
MCHC RBC AUTO-ENTMCNC: 32.8 G/DL (ref 33.6–35)
MCV RBC AUTO: 89.1 FL (ref 81.4–97.8)
MONOCYTES # BLD AUTO: 0.61 K/UL (ref 0–0.85)
MONOCYTES NFR BLD AUTO: 6.9 % (ref 0–13.4)
NEUTROPHILS # BLD AUTO: 4.88 K/UL (ref 2–7.15)
NEUTROPHILS NFR BLD: 55.1 % (ref 44–72)
NRBC # BLD AUTO: 0 K/UL
NRBC BLD AUTO-RTO: 0 /100 WBC
PLATELET # BLD AUTO: 174 K/UL (ref 164–446)
PMV BLD AUTO: 9.9 FL (ref 9–12.9)
POTASSIUM SERPL-SCNC: 3.2 MMOL/L (ref 3.6–5.5)
PROT SERPL-MCNC: 5.6 G/DL (ref 6–8.2)
RBC # BLD AUTO: 3.94 M/UL (ref 4.2–5.4)
SODIUM SERPL-SCNC: 138 MMOL/L (ref 135–145)
WBC # BLD AUTO: 8.9 K/UL (ref 4.8–10.8)

## 2017-03-20 PROCEDURE — 160036 HCHG PACU - EA ADDL 30 MINS PHASE I: Performed by: INTERNAL MEDICINE

## 2017-03-20 PROCEDURE — 74328 X-RAY BILE DUCT ENDOSCOPY: CPT

## 2017-03-20 PROCEDURE — 85025 COMPLETE CBC W/AUTO DIFF WBC: CPT

## 2017-03-20 PROCEDURE — 0FT44ZZ RESECTION OF GALLBLADDER, PERCUTANEOUS ENDOSCOPIC APPROACH: ICD-10-PCS | Performed by: INTERNAL MEDICINE

## 2017-03-20 PROCEDURE — 160002 HCHG RECOVERY MINUTES (STAT): Performed by: INTERNAL MEDICINE

## 2017-03-20 PROCEDURE — 160009 HCHG ANES TIME/MIN: Performed by: INTERNAL MEDICINE

## 2017-03-20 PROCEDURE — 80053 COMPREHEN METABOLIC PANEL: CPT

## 2017-03-20 PROCEDURE — 700111 HCHG RX REV CODE 636 W/ 250 OVERRIDE (IP): Performed by: INTERNAL MEDICINE

## 2017-03-20 PROCEDURE — A9270 NON-COVERED ITEM OR SERVICE: HCPCS | Performed by: INTERNAL MEDICINE

## 2017-03-20 PROCEDURE — 700101 HCHG RX REV CODE 250

## 2017-03-20 PROCEDURE — 770006 HCHG ROOM/CARE - MED/SURG/GYN SEMI*

## 2017-03-20 PROCEDURE — 502000 HCHG MISC OR IMPLANTS RC 0278: Performed by: INTERNAL MEDICINE

## 2017-03-20 PROCEDURE — 700102 HCHG RX REV CODE 250 W/ 637 OVERRIDE(OP): Performed by: INTERNAL MEDICINE

## 2017-03-20 PROCEDURE — 0FC98ZZ EXTIRPATION OF MATTER FROM COMMON BILE DUCT, VIA NATURAL OR ARTIFICIAL OPENING ENDOSCOPIC: ICD-10-PCS | Performed by: INTERNAL MEDICINE

## 2017-03-20 PROCEDURE — 110371 HCHG SHELL REV 272: Performed by: INTERNAL MEDICINE

## 2017-03-20 PROCEDURE — 700117 HCHG RX CONTRAST REV CODE 255

## 2017-03-20 PROCEDURE — 36415 COLL VENOUS BLD VENIPUNCTURE: CPT

## 2017-03-20 PROCEDURE — 160203 HCHG ENDO MINUTES - 1ST 30 MINS LEVEL 4: Performed by: INTERNAL MEDICINE

## 2017-03-20 PROCEDURE — 93005 ELECTROCARDIOGRAM TRACING: CPT | Performed by: INTERNAL MEDICINE

## 2017-03-20 PROCEDURE — 93005 ELECTROCARDIOGRAM TRACING: CPT | Performed by: ANESTHESIOLOGY

## 2017-03-20 PROCEDURE — 500066 HCHG BITE BLOCK, ECT: Performed by: INTERNAL MEDICINE

## 2017-03-20 PROCEDURE — 93010 ELECTROCARDIOGRAM REPORT: CPT | Performed by: INTERNAL MEDICINE

## 2017-03-20 PROCEDURE — 700101 HCHG RX REV CODE 250: Performed by: INTERNAL MEDICINE

## 2017-03-20 PROCEDURE — 83690 ASSAY OF LIPASE: CPT

## 2017-03-20 PROCEDURE — 160035 HCHG PACU - 1ST 60 MINS PHASE I: Performed by: INTERNAL MEDICINE

## 2017-03-20 PROCEDURE — 160048 HCHG OR STATISTICAL LEVEL 1-5: Performed by: INTERNAL MEDICINE

## 2017-03-20 PROCEDURE — A4606 OXYGEN PROBE USED W OXIMETER: HCPCS | Performed by: INTERNAL MEDICINE

## 2017-03-20 PROCEDURE — 99232 SBSQ HOSP IP/OBS MODERATE 35: CPT | Performed by: INTERNAL MEDICINE

## 2017-03-20 RX ORDER — SODIUM CHLORIDE, SODIUM LACTATE, POTASSIUM CHLORIDE, CALCIUM CHLORIDE 600; 310; 30; 20 MG/100ML; MG/100ML; MG/100ML; MG/100ML
1000 INJECTION, SOLUTION INTRAVENOUS
Status: DISCONTINUED | OUTPATIENT
Start: 2017-03-20 | End: 2017-03-22 | Stop reason: HOSPADM

## 2017-03-20 RX ORDER — DEXTROSE MONOHYDRATE, SODIUM CHLORIDE, AND POTASSIUM CHLORIDE 50; 1.49; 4.5 G/1000ML; G/1000ML; G/1000ML
INJECTION, SOLUTION INTRAVENOUS CONTINUOUS
Status: DISCONTINUED | OUTPATIENT
Start: 2017-03-20 | End: 2017-03-22 | Stop reason: HOSPADM

## 2017-03-20 RX ADMIN — FERROUS SULFATE TAB 325 MG (65 MG ELEMENTAL FE) 325 MG: 325 (65 FE) TAB at 20:29

## 2017-03-20 RX ADMIN — ENALAPRILAT 1.25 MG: 2.5 INJECTION INTRAVENOUS at 13:30

## 2017-03-20 RX ADMIN — METRONIDAZOLE 500 MG: 500 INJECTION, SOLUTION INTRAVENOUS at 06:31

## 2017-03-20 RX ADMIN — OMEPRAZOLE 20 MG: 20 CAPSULE, DELAYED RELEASE ORAL at 08:35

## 2017-03-20 RX ADMIN — METRONIDAZOLE 500 MG: 500 INJECTION, SOLUTION INTRAVENOUS at 21:39

## 2017-03-20 RX ADMIN — METRONIDAZOLE 500 MG: 500 INJECTION, SOLUTION INTRAVENOUS at 13:38

## 2017-03-20 RX ADMIN — METOPROLOL TARTRATE 12.5 MG: 25 TABLET ORAL at 08:33

## 2017-03-20 RX ADMIN — POTASSIUM CHLORIDE, DEXTROSE MONOHYDRATE AND SODIUM CHLORIDE: 150; 5; 450 INJECTION, SOLUTION INTRAVENOUS at 13:37

## 2017-03-20 RX ADMIN — CIPROFLOXACIN 400 MG: 2 INJECTION, SOLUTION INTRAVENOUS at 08:31

## 2017-03-20 RX ADMIN — SODIUM CHLORIDE, SODIUM LACTATE, POTASSIUM CHLORIDE, CALCIUM CHLORIDE 1000 ML: 600; 310; 30; 20 INJECTION, SOLUTION INTRAVENOUS at 14:51

## 2017-03-20 RX ADMIN — CIPROFLOXACIN 400 MG: 2 INJECTION, SOLUTION INTRAVENOUS at 20:30

## 2017-03-20 RX ADMIN — METOPROLOL TARTRATE 12.5 MG: 25 TABLET ORAL at 20:29

## 2017-03-20 ASSESSMENT — PAIN SCALES - GENERAL
PAINLEVEL_OUTOF10: 0

## 2017-03-20 ASSESSMENT — ENCOUNTER SYMPTOMS
HEADACHES: 0
ABDOMINAL PAIN: 0
CARDIOVASCULAR NEGATIVE: 1
NAUSEA: 0
FEVER: 0
RESPIRATORY NEGATIVE: 1
SHORTNESS OF BREATH: 0
GASTROINTESTINAL NEGATIVE: 1

## 2017-03-20 ASSESSMENT — PAIN SCALES - WONG BAKER: WONGBAKER_NUMERICALRESPONSE: HURTS JUST A LITTLE BIT

## 2017-03-20 NOTE — CARE PLAN
Problem: Communication  Goal: The ability to communicate needs accurately and effectively will improve  Outcome: PROGRESSING AS EXPECTED  Use of translation farhana or daughter to translate for pt.

## 2017-03-20 NOTE — PROGRESS NOTES
"  Trauma/Surgical Progress Note    Author: Chrissie Patel Date & Time created: 3/20/2017   10:34 AM     Interval Events:  Doing better. LFTs and lipase down. ERCP today. Lap belén 3/21     Review of Systems   Respiratory: Negative.    Cardiovascular: Negative.    Gastrointestinal: Negative.      Hemodynamics:  Blood pressure 160/63, pulse 78, temperature 36.4 °C (97.6 °F), resp. rate 17, height 1.473 m (4' 9.99\"), weight 57.6 kg (126 lb 15.8 oz), SpO2 95 %.     Respiratory:    Respiration: 17, Pulse Oximetry: 95 %        RUL Breath Sounds: Clear, RML Breath Sounds: Clear, RLL Breath Sounds: Clear, ALEA Breath Sounds: Clear, LLL Breath Sounds: Clear  Fluids:    Intake/Output Summary (Last 24 hours) at 03/20/17 1034  Last data filed at 03/19/17 1800   Gross per 24 hour   Intake   1440 ml   Output      0 ml   Net   1440 ml     Admit Weight: 57.6 kg (126 lb 15.8 oz)  Current      Physical Exam   Constitutional: She appears well-developed and well-nourished.   HENT:   Head: Normocephalic.   Eyes: No scleral icterus.   Abdominal: Soft. She exhibits no distension. There is no tenderness.   Musculoskeletal: Normal range of motion.   Neurological: She is alert.   Skin: Skin is warm.       Medical Decision Making/Problem List:    Active Hospital Problems    Diagnosis   • Choledocholithiasis [K80.50]     Priority: High     3/20- ERCP today       • Gallstone pancreatitis [K85.10]     Priority: High     Plan lap belén 3/21       Core Measures & Quality Metrics:  Labs reviewed, Medications reviewed and Radiology images reviewed  [place] catheter: No [place]                  LAYTON Score  Discussed patient condition with RN and Patient.  CRITICAL CARE TIME EXCLUDING PROCEDURES:20   minutes      "

## 2017-03-20 NOTE — CARE PLAN
Problem: Knowledge Deficit  Goal: Knowledge of disease process/condition, treatment plan, diagnostic tests, and medications will improve  Intervention: Explain information regarding disease process/condition, treatment plan, diagnostic tests, and medications and document in education  Plan for ERCP today and lap belén tomorrow. Seen by Dr Patel this am  Non jaundiced denies n/v. NPO.

## 2017-03-20 NOTE — PROGRESS NOTES
3/19/17    1900    Report taken, assumed care of Pt. Daughter BS and providing translation. Pt denies pain or discomfort at this time. Reviewed POC for the shift and all questions answered. Call light and possessions within reach. Pt denies needs at this time. Will continue to monitor.

## 2017-03-20 NOTE — PROGRESS NOTES
Hospital Medicine Progress Note, Adult, Complex               Author: Samuel Ray Date & Time created: 3/19/2017  6:11 PM     Interval History:  Patient presents with abdominal pain and nausea and vomiting. Patient found to have acute pancreatitis secondary to gallstone obstruction.   3/19/17:  Patient seen and examined early this afternoon.  Patient resting comfortably in bed and denies pain.    Review of Systems:  Review of Systems   Constitutional: Negative for fever.   Respiratory: Negative for shortness of breath.    Cardiovascular: Negative for chest pain.   Gastrointestinal: Negative for nausea and abdominal pain.   Neurological: Negative for headaches.       Physical Exam:  Physical Exam   Constitutional: She is oriented to person, place, and time. No distress.   HENT:   Head: Normocephalic and atraumatic.   Mouth/Throat: No oropharyngeal exudate.   Eyes: EOM are normal. Pupils are equal, round, and reactive to light. Right eye exhibits no discharge. Left eye exhibits no discharge.   Neck: Normal range of motion. Neck supple. No thyromegaly present.   Cardiovascular: Normal rate and regular rhythm.    Pulmonary/Chest: Effort normal and breath sounds normal. No respiratory distress.   Abdominal: Soft. Bowel sounds are normal. There is tenderness. There is no guarding.   Neurological: She is alert and oriented to person, place, and time. No cranial nerve deficit.   Skin: Skin is warm and dry. She is not diaphoretic.   Psychiatric: She has a normal mood and affect.       Labs:        Invalid input(s): [id number]      Recent Labs      03/18/17   1500  03/19/17   0416   SODIUM  138  142   POTASSIUM  3.3*  4.1   CHLORIDE  102  111   CO2  24  25   BUN  34*  20   CREATININE  1.00  0.87   CALCIUM  9.5  8.1*     Recent Labs      03/18/17   1500  03/19/17   0416   ALTSGPT  218*  155*   ASTSGOT  414*  112*   ALKPHOSPHAT  103*  77   TBILIRUBIN  1.5  0.7   LIPASE  >400*  >400*   GLUCOSE  177*  94     Recent Labs       17   1500  17   0416   RBC  4.89  4.05*   HEMOGLOBIN  14.5  12.2   HEMATOCRIT  42.2  36.0*   PLATELETCT  220  191   PROTHROMBTM   --   14.0   INR   --   1.10     Recent Labs      17   1500  17   0416   WBC  14.8*  9.8   NEUTSPOLYS  85.30*  68.80   LYMPHOCYTES  8.90*  24.00   MONOCYTES  4.90  6.10   EOSINOPHILS  0.20  0.50   BASOPHILS  0.40  0.20   ASTSGOT  414*  112*   ALTSGPT  218*  155*   ALKPHOSPHAT  103*  77   TBILIRUBIN  1.5  0.7           Hemodynamics:  Temp (24hrs), Av.6 °C (97.9 °F), Min:36.5 °C (97.7 °F), Max:36.7 °C (98 °F)  Temperature: 36.5 °C (97.7 °F)  Pulse  Av.3  Min: 51  Max: 89   Blood Pressure : 127/59 mmHg     Respiratory:    Respiration: 16, Pulse Oximetry: 95 %, O2 Daily Delivery Respiratory : Room Air with O2 Available     Work Of Breathing / Effort: Mild  RUL Breath Sounds: Clear, RML Breath Sounds: Clear, RLL Breath Sounds: Clear, ALEA Breath Sounds: Clear, LLL Breath Sounds: Clear  Fluids:    Intake/Output Summary (Last 24 hours) at 17 1811  Last data filed at 17 1300   Gross per 24 hour   Intake    240 ml   Output      0 ml   Net    240 ml     Weight: 57.6 kg (126 lb 15.8 oz)  GI/Nutrition:  Orders Placed This Encounter   Procedures   • DIET ORDER     Standing Status: Standing      Number of Occurrences: 1      Standing Expiration Date:      Order Specific Question:  Diet:     Answer:  Clear Liquid [10]   • DIET NPO     Standing Status: Standing      Number of Occurrences: 8      Standing Expiration Date:      Order Specific Question:  Restrict to:     Answer:  Strict [1]     Medical Decision Making, by Problem:  Active Hospital Problems    Diagnosis   • *Gallstone pancreatitis [K85.10]  -- continue IV hydration and npo status.     -- iv pain medicaiton as needed.    • Choledocholithiasis [K80.50]  -- gastroenterologist to take patient for ERCP tomorrow  -- patient will need laparoscopic cholecystectomy post ercp.   Hyperglycemia  -- resolved.   Continue to monitor  Leukocytosis  -- likely due to acute pancreatitis            DVT prophylaxis - mechanical: SCDs  Ulcer prophylaxis: Yes

## 2017-03-20 NOTE — OR SURGEON
Immediate Post-Operative Note      PreOp Diagnosis: Gallstone pancreatitis    PostOp Diagnosis: 1. Dilated CBD/CHD to 1cm 2. 3mm CBD stone 3. Patent cystic duct    Procedure(s):  ERCP - Wound Class: Clean Contaminated  ERCP W/REMOVAL CALCULUS - Wound Class: Clean Contaminated    Surgeon(s):  Heri Fernandes D.O.    Anesthesiologist/Type of Anesthesia:  Anesthesiologist: Samuel Dasilva M.D./General    Surgical Staff:  Circulator: Karsten Meza R.N.  Endoscopy Technician: Joya Merlos    Specimen: none    Estimated Blood Loss: none    Findings: above    Complications: none    Plan: Lap belén tomorrow.         3/20/2017 4:43 PM Heri Fernandes

## 2017-03-20 NOTE — CARE PLAN
Problem: Safety  Goal: Will remain free from falls  Outcome: PROGRESSING AS EXPECTED  Patient educated to call for assisstance, treaded slipper socks, call light w/in reach.     Problem: Pain Management  Goal: Pain level will decrease to patient’s comfort goal  Outcome: PROGRESSING AS EXPECTED  Patient educated on importance of pain management. Patient understands need for scheduled analgesics to prevent pain above comfort level.

## 2017-03-21 PROBLEM — K81.9 CHOLECYSTITIS: Status: ACTIVE | Noted: 2017-03-21

## 2017-03-21 PROBLEM — I10 HTN (HYPERTENSION): Status: ACTIVE | Noted: 2017-03-21

## 2017-03-21 LAB
ALBUMIN SERPL BCP-MCNC: 3.3 G/DL (ref 3.2–4.9)
ALBUMIN/GLOB SERPL: 1.4 G/DL
ALP SERPL-CCNC: 77 U/L (ref 30–99)
ALT SERPL-CCNC: 79 U/L (ref 2–50)
ANION GAP SERPL CALC-SCNC: 5 MMOL/L (ref 0–11.9)
AST SERPL-CCNC: 40 U/L (ref 12–45)
BASOPHILS # BLD AUTO: 0.3 % (ref 0–1.8)
BASOPHILS # BLD: 0.03 K/UL (ref 0–0.12)
BILIRUB SERPL-MCNC: 0.5 MG/DL (ref 0.1–1.5)
BUN SERPL-MCNC: 5 MG/DL (ref 8–22)
CALCIUM SERPL-MCNC: 8 MG/DL (ref 8.4–10.2)
CHLORIDE SERPL-SCNC: 112 MMOL/L (ref 96–112)
CO2 SERPL-SCNC: 24 MMOL/L (ref 20–33)
CREAT SERPL-MCNC: 0.73 MG/DL (ref 0.5–1.4)
EOSINOPHIL # BLD AUTO: 0.2 K/UL (ref 0–0.51)
EOSINOPHIL NFR BLD: 2.3 % (ref 0–6.9)
ERYTHROCYTE [DISTWIDTH] IN BLOOD BY AUTOMATED COUNT: 41.9 FL (ref 35.9–50)
GFR SERPL CREATININE-BSD FRML MDRD: >60 ML/MIN/1.73 M 2
GLOBULIN SER CALC-MCNC: 2.4 G/DL (ref 1.9–3.5)
GLUCOSE SERPL-MCNC: 110 MG/DL (ref 65–99)
HCT VFR BLD AUTO: 33.7 % (ref 37–47)
HGB BLD-MCNC: 11.6 G/DL (ref 12–16)
IMM GRANULOCYTES # BLD AUTO: 0.03 K/UL (ref 0–0.11)
IMM GRANULOCYTES NFR BLD AUTO: 0.3 % (ref 0–0.9)
LYMPHOCYTES # BLD AUTO: 2.34 K/UL (ref 1–4.8)
LYMPHOCYTES NFR BLD: 27.1 % (ref 22–41)
MAGNESIUM SERPL-MCNC: 1.7 MG/DL (ref 1.5–2.5)
MCH RBC QN AUTO: 29.7 PG (ref 27–33)
MCHC RBC AUTO-ENTMCNC: 34.4 G/DL (ref 33.6–35)
MCV RBC AUTO: 86.4 FL (ref 81.4–97.8)
MONOCYTES # BLD AUTO: 0.61 K/UL (ref 0–0.85)
MONOCYTES NFR BLD AUTO: 7.1 % (ref 0–13.4)
NEUTROPHILS # BLD AUTO: 5.41 K/UL (ref 2–7.15)
NEUTROPHILS NFR BLD: 62.9 % (ref 44–72)
NRBC # BLD AUTO: 0 K/UL
NRBC BLD AUTO-RTO: 0 /100 WBC
PATHOLOGY CONSULT NOTE: NORMAL
PLATELET # BLD AUTO: 183 K/UL (ref 164–446)
PMV BLD AUTO: 10.1 FL (ref 9–12.9)
POTASSIUM SERPL-SCNC: 3.4 MMOL/L (ref 3.6–5.5)
PROT SERPL-MCNC: 5.7 G/DL (ref 6–8.2)
RBC # BLD AUTO: 3.9 M/UL (ref 4.2–5.4)
SODIUM SERPL-SCNC: 141 MMOL/L (ref 135–145)
WBC # BLD AUTO: 8.6 K/UL (ref 4.8–10.8)

## 2017-03-21 PROCEDURE — 36415 COLL VENOUS BLD VENIPUNCTURE: CPT

## 2017-03-21 PROCEDURE — 110382 HCHG SHELL REV 271: Performed by: SURGERY

## 2017-03-21 PROCEDURE — 501572 HCHG TROCAR, SHIELD OBTU 5X100: Performed by: SURGERY

## 2017-03-21 PROCEDURE — 501838 HCHG SUTURE GENERAL: Performed by: SURGERY

## 2017-03-21 PROCEDURE — 700111 HCHG RX REV CODE 636 W/ 250 OVERRIDE (IP)

## 2017-03-21 PROCEDURE — A9270 NON-COVERED ITEM OR SERVICE: HCPCS | Performed by: INTERNAL MEDICINE

## 2017-03-21 PROCEDURE — 110371 HCHG SHELL REV 272: Performed by: SURGERY

## 2017-03-21 PROCEDURE — 160035 HCHG PACU - 1ST 60 MINS PHASE I: Performed by: SURGERY

## 2017-03-21 PROCEDURE — 80053 COMPREHEN METABOLIC PANEL: CPT

## 2017-03-21 PROCEDURE — 160002 HCHG RECOVERY MINUTES (STAT): Performed by: SURGERY

## 2017-03-21 PROCEDURE — 700101 HCHG RX REV CODE 250: Performed by: INTERNAL MEDICINE

## 2017-03-21 PROCEDURE — 88304 TISSUE EXAM BY PATHOLOGIST: CPT

## 2017-03-21 PROCEDURE — 500800 HCHG LAPAROSCOPIC J/L HOOK: Performed by: SURGERY

## 2017-03-21 PROCEDURE — 160048 HCHG OR STATISTICAL LEVEL 1-5: Performed by: SURGERY

## 2017-03-21 PROCEDURE — 83735 ASSAY OF MAGNESIUM: CPT

## 2017-03-21 PROCEDURE — A6402 STERILE GAUZE <= 16 SQ IN: HCPCS | Performed by: SURGERY

## 2017-03-21 PROCEDURE — 502571 HCHG PACK, LAP CHOLE: Performed by: SURGERY

## 2017-03-21 PROCEDURE — 160039 HCHG SURGERY MINUTES - EA ADDL 1 MIN LEVEL 3: Performed by: SURGERY

## 2017-03-21 PROCEDURE — BF10YZZ FLUOROSCOPY OF BILE DUCTS USING OTHER CONTRAST: ICD-10-PCS | Performed by: SURGERY

## 2017-03-21 PROCEDURE — 700102 HCHG RX REV CODE 250 W/ 637 OVERRIDE(OP): Performed by: INTERNAL MEDICINE

## 2017-03-21 PROCEDURE — 700111 HCHG RX REV CODE 636 W/ 250 OVERRIDE (IP): Performed by: INTERNAL MEDICINE

## 2017-03-21 PROCEDURE — 160028 HCHG SURGERY MINUTES - 1ST 30 MINS LEVEL 3: Performed by: SURGERY

## 2017-03-21 PROCEDURE — 85025 COMPLETE CBC W/AUTO DIFF WBC: CPT

## 2017-03-21 PROCEDURE — 770006 HCHG ROOM/CARE - MED/SURG/GYN SEMI*

## 2017-03-21 PROCEDURE — A4606 OXYGEN PROBE USED W OXIMETER: HCPCS | Performed by: SURGERY

## 2017-03-21 PROCEDURE — 160009 HCHG ANES TIME/MIN: Performed by: SURGERY

## 2017-03-21 PROCEDURE — 99232 SBSQ HOSP IP/OBS MODERATE 35: CPT | Performed by: INTERNAL MEDICINE

## 2017-03-21 PROCEDURE — 500514 HCHG ENDOCLIP: Performed by: SURGERY

## 2017-03-21 PROCEDURE — 93010 ELECTROCARDIOGRAM REPORT: CPT | Performed by: INTERNAL MEDICINE

## 2017-03-21 PROCEDURE — 700101 HCHG RX REV CODE 250

## 2017-03-21 PROCEDURE — 501583 HCHG TROCAR, THRD CAN&SEAL 5X100: Performed by: SURGERY

## 2017-03-21 RX ORDER — PROMETHAZINE HYDROCHLORIDE 25 MG/ML
12.5 INJECTION, SOLUTION INTRAMUSCULAR; INTRAVENOUS EVERY 6 HOURS PRN
Status: DISCONTINUED | OUTPATIENT
Start: 2017-03-21 | End: 2017-03-21

## 2017-03-21 RX ORDER — BUPIVACAINE HYDROCHLORIDE 2.5 MG/ML
INJECTION, SOLUTION INFILTRATION; PERINEURAL
Status: DISCONTINUED | OUTPATIENT
Start: 2017-03-21 | End: 2017-03-21 | Stop reason: HOSPADM

## 2017-03-21 RX ORDER — SODIUM CHLORIDE, SODIUM LACTATE, POTASSIUM CHLORIDE, CALCIUM CHLORIDE 600; 310; 30; 20 MG/100ML; MG/100ML; MG/100ML; MG/100ML
INJECTION, SOLUTION INTRAVENOUS
Status: DISCONTINUED | OUTPATIENT
Start: 2017-03-21 | End: 2017-03-22 | Stop reason: HOSPADM

## 2017-03-21 RX ADMIN — CIPROFLOXACIN 400 MG: 2 INJECTION, SOLUTION INTRAVENOUS at 08:32

## 2017-03-21 RX ADMIN — METRONIDAZOLE 500 MG: 500 INJECTION, SOLUTION INTRAVENOUS at 13:16

## 2017-03-21 RX ADMIN — FERROUS SULFATE TAB 325 MG (65 MG ELEMENTAL FE) 325 MG: 325 (65 FE) TAB at 20:00

## 2017-03-21 RX ADMIN — METOPROLOL TARTRATE 12.5 MG: 25 TABLET ORAL at 20:00

## 2017-03-21 RX ADMIN — ONDANSETRON 4 MG: 2 INJECTION, SOLUTION INTRAMUSCULAR; INTRAVENOUS at 13:16

## 2017-03-21 RX ADMIN — HYDROCHLOROTHIAZIDE 12.5 MG: 25 TABLET ORAL at 08:38

## 2017-03-21 RX ADMIN — METRONIDAZOLE 500 MG: 500 INJECTION, SOLUTION INTRAVENOUS at 21:16

## 2017-03-21 RX ADMIN — LOSARTAN POTASSIUM 50 MG: 25 TABLET, FILM COATED ORAL at 08:38

## 2017-03-21 RX ADMIN — MORPHINE SULFATE 2 MG: 4 INJECTION INTRAVENOUS at 16:08

## 2017-03-21 RX ADMIN — METRONIDAZOLE 500 MG: 500 INJECTION, SOLUTION INTRAVENOUS at 06:26

## 2017-03-21 RX ADMIN — CIPROFLOXACIN 400 MG: 2 INJECTION, SOLUTION INTRAVENOUS at 20:01

## 2017-03-21 RX ADMIN — POTASSIUM CHLORIDE, DEXTROSE MONOHYDRATE AND SODIUM CHLORIDE: 150; 5; 450 INJECTION, SOLUTION INTRAVENOUS at 06:26

## 2017-03-21 RX ADMIN — METOPROLOL TARTRATE 12.5 MG: 25 TABLET ORAL at 08:38

## 2017-03-21 ASSESSMENT — ENCOUNTER SYMPTOMS
BLURRED VISION: 0
DEPRESSION: 0
NAUSEA: 1
DIZZINESS: 0
MYALGIAS: 0
HEARTBURN: 0
COUGH: 0
FEVER: 0
HEADACHES: 0

## 2017-03-21 ASSESSMENT — PAIN SCALES - GENERAL
PAINLEVEL_OUTOF10: 0
PAINLEVEL_OUTOF10: ASSUMED PAIN PRESENT
PAINLEVEL_OUTOF10: 0

## 2017-03-21 ASSESSMENT — PAIN SCALES - WONG BAKER: WONGBAKER_NUMERICALRESPONSE: HURTS JUST A LITTLE BIT

## 2017-03-21 NOTE — OR NURSING
1720 - Report from IRVING Jacobs. Patient awake and cooperative. Denies pain. Denies nausea. Abdomen soft. VS as noted.     1735 - Remains as above. VS as noted. meets  criteria for transfer to room. Called to give report to [facility] RN. Awaiting availability of RN.     1750 - Remains as above. Tolerating sips of water. VS as noted. Report to IRVING Hartman.     1755 - Patient transferred to room via bed on O2 via NC at 2 lpm by RN and Transport Tech.

## 2017-03-21 NOTE — H&P
REQUESTING PHYSICIAN:  Izzy Solis MD    DATE OF CONSULTATION: 3/19/17    REASON FOR CONSULTATION:  The patient is an 85-year-old woman who presented to   the emergency room with abdominal pain.  She is feeling well the night prior   when she started having abdominal pain after eating.  She was brought to the   emergency room where she was found to have not only cholelithiasis, but also   evidence of choledocholithiasis with a dilated common duct.  I have been asked   to see the patient in regards to this.    PAST MEDICAL HISTORY:  Illnesses are hypertension and reflux.    PAST SURGICAL HISTORY:  Appendectomy.    MEDICATIONS:  Currently are omeprazole, iron, Allegra, Lasix, Hyzaar,   Lopressor.    ALLERGIES:  PENICILLIN.    SOCIAL HISTORY:  She lives in [place].  She smoked in the remote past.  She   does not drink.    REVIEW OF SYSTEMS:  Otherwise unremarkable.    PHYSICAL EXAMINATION:  VITAL SIGNS:  She is afebrile.  GENERAL:  She is alert and cooperative.  HEENT:  Pupils are 3 mm.  She is anicteric.  NECK:  Supple.  LUNGS:  Clear to auscultation.  HEART:  Regular rate and rhythm.  ABDOMEN:  Soft.  Some mild tenderness in the epigastrium.  EXTREMITIES:  Without edema.  NEUROLOGIC:  Intact.    LABORATORY DATA:  White count is 14,000 on admission.  Her electrolytes show   elevated liver function tests and a lipase of 400.  Ultrasound showed as noted   above.    IMPRESSION:  This is an 85-year-old female with cholelithiasis,   choledocholithiasis and apparently gallstone pancreatitis.    PLAN:  She has been admitted by the hospitalist.  She is on IV antibiotics.    She will be seen by GI and undergo an ERCP.  Once that is complete, we will   proceed with laparoscopic cholecystectomy.  The procedure was described to the   patient and her daughter as well as the risks including bleeding, infection,   conversion to an open procedure, intraabdominal injuries, and anesthetic   risks.  They understand and  wish to proceed.       ____________________________________     MD DON STUART / ABLERTO    DD:  03/21/2017 11:13:22  DT:  03/21/2017 12:14:57    D#:  [id number]  Job#:  [id number]    cc: DANNY SAPP MD

## 2017-03-21 NOTE — PROGRESS NOTES
Hospital Medicine Progress Note, Adult, Complex               Author: Samuel Ray Date & Time created: 3/20/2017  6:11 PM     Interval History:  Patient presents with abdominal pain and nausea and vomiting. Patient found to have acute pancreatitis secondary to gallstone obstruction.   3/19/17:  Patient seen and examined early this afternoon.  Patient resting comfortably in bed and denies pain.  3/20/17: Seen and examined patient  This morning.   Patient states that she has no pain and not nauseated.    Review of Systems:  Review of Systems   Constitutional: Negative for fever.   Respiratory: Negative for shortness of breath.    Cardiovascular: Negative for chest pain.   Gastrointestinal: Negative for nausea and abdominal pain.   Neurological: Negative for headaches.       Physical Exam:  Physical Exam   Constitutional: She is oriented to person, place, and time. No distress.   HENT:   Head: Normocephalic and atraumatic.   Mouth/Throat: No oropharyngeal exudate.   Eyes: EOM are normal. Pupils are equal, round, and reactive to light. Right eye exhibits no discharge. Left eye exhibits no discharge.   Neck: Normal range of motion. Neck supple. No thyromegaly present.   Cardiovascular: Normal rate and regular rhythm.    Pulmonary/Chest: Effort normal and breath sounds normal. No respiratory distress.   Abdominal: Soft. Bowel sounds are normal. She exhibits no distension. There is no tenderness. There is no guarding.   Neurological: She is alert and oriented to person, place, and time. No cranial nerve deficit.   Skin: Skin is warm and dry. She is not diaphoretic.   Psychiatric: She has a normal mood and affect.       Labs:        Invalid input(s): [id number]      Recent Labs      03/18/17   1500  03/19/17   0416  03/20/17   0528   SODIUM  138  142  138   POTASSIUM  3.3*  4.1  3.2*   CHLORIDE  102  111  111   CO2  24  25  21   BUN  34*  20  6*   CREATININE  1.00  0.87  0.69   CALCIUM  9.5  8.1*  8.1*     Recent Labs       17   1500  17   0416  17   0528   ALTSGPT  218*  155*  90*   ASTSGOT  414*  112*  38   ALKPHOSPHAT  103*  77  64   TBILIRUBIN  1.5  0.7  0.6   LIPASE  >400*  >400*  51   GLUCOSE  177*  94  110*     Recent Labs      17   1500  17   0416  17   0528   RBC  4.89  4.05*  3.94*   HEMOGLOBIN  14.5  12.2  11.5*   HEMATOCRIT  42.2  36.0*  35.1*   PLATELETCT  220  191  174   PROTHROMBTM   --   14.0   --    INR   --   1.10   --      Recent Labs      17   1500  17   0416  17   0528   WBC  14.8*  9.8  8.9   NEUTSPOLYS  85.30*  68.80  55.10   LYMPHOCYTES  8.90*  24.00  34.80   MONOCYTES  4.90  6.10  6.90   EOSINOPHILS  0.20  0.50  2.30   BASOPHILS  0.40  0.20  0.60   ASTSGOT  414*  112*  38   ALTSGPT  218*  155*  90*   ALKPHOSPHAT  103*  77  64   TBILIRUBIN  1.5  0.7  0.6           Hemodynamics:  Temp (24hrs), Av.8 °C (98.2 °F), Min:36.4 °C (97.6 °F), Max:37.1 °C (98.8 °F)  Temperature: 37.1 °C (98.8 °F)  Pulse  Av.7  Min: 51  Max: 89Heart Rate (Monitored): 60  Blood Pressure : 133/60 mmHg     Respiratory:    Respiration: 16, Pulse Oximetry: 100 %        RUL Breath Sounds: Clear, RML Breath Sounds: Clear, RLL Breath Sounds: Clear, ALEA Breath Sounds: Clear, LLL Breath Sounds: Clear  Fluids:    Intake/Output Summary (Last 24 hours) at 17 1811  Last data filed at 17 1654   Gross per 24 hour   Intake    400 ml   Output      0 ml   Net    400 ml        GI/Nutrition:  Orders Placed This Encounter   Procedures   • DIET ORDER     Standing Status: Standing      Number of Occurrences: 1      Standing Expiration Date:      Order Specific Question:  Diet:     Answer:  Clear Liquid [10]     Medical Decision Making, by Problem:  Active Hospital Problems    Diagnosis   • *Gallstone pancreatitis [K85.10]  -- continue IV hydration but start clear liquid diet.  -- iv pain medication as needed.    • Choledocholithiasis [K80.50]  -- ERCP done  -- 3/21/17 laparoscopic  cholecystectomy post ercp.   Hyperglycemia  -- resolved.  Continue to monitor  Leukocytosis resolved  -- likely due to acute pancreatitis            DVT prophylaxis - mechanical: SCDs  Ulcer prophylaxis: Yes

## 2017-03-21 NOTE — PROGRESS NOTES
Received from pacu via bed.drowsy but easily aroused.  Lap stab sites cdi,denies pain,is nauseated.medicated with zofran.  dtr assisted pt to the br.voided  Iv infusing

## 2017-03-21 NOTE — PROCEDURES
DATE OF SERVICE:  03/20/2017    PREOPERATIVE DIAGNOSIS:  Gallstone pancreatitis.    POSTOPERATIVE DIAGNOSES:  1.  Dilated common bile duct and common hepatic duct 1 cm.  2.  A 3 mm distal common bile duct filling defect consistent with stone.  3.  Patent cystic duct.    PROCEDURE:  Endoscopic retrograde cholangiopancreatography.    SUB-PROCEDURE:  1.  Biliary sphincterotomy.  2.  Balloon common bile duct sweeps.    HISTORY:  This is an 85-year-old female who presents with acute gallstone   pancreatitis, obstructive liver enzyme pattern and dilated bile duct on   imaging.  Informed consent was obtained after explanation of risks, benefits,   and alternatives.    DESCRIPTION OF PROCEDURE:  The procedure was performed in the main OR at   [facility].  Patient was placed under general   anesthesia and monitored throughout the procedure.  The Olympus    side-viewing duodenoscope was passed via the oropharynx into the esophagus   blindly without difficulty.  It was then advanced through the stomach and   pylorus into the duodenum.  Second portion of duodenum was reached.  Major   papilla was visualized and appeared endoscopically normal.  Using a [employer] cannulatome, guidewire cannulation of the bile duct was performed.    A cholangiogram was then obtained.  Common bile duct and common hepatic duct   were dilated to 1 cm.  The intrahepatic ducts were dilated.  Cystic duct was   patent.  The gallbladder filled.  Within the distal common bile duct, a 3 mm   filling defect was identified consistent with the stone over the guidewire, a   medium sized sphincterotomy was performed without complication.  Catheter was   withdrawn.  A balloon catheter was then advanced into the bile duct and   multiple sweeps of the bile duct were performed.  No obvious stone was   identified on extraction.  An occlusion cholangiogram was then obtained   showing no residual filling defects.  Catheter was  withdrawn.  There was   excellent drainage of contrast and bile seen both endoscopically and   fluoroscopically.    IMPRESSION:  An 85-year-old female with gallstone pancreatitis.  ERCP shows a   dilated common bile duct, a 3 mm distal common bile duct filling defect   consistent with stone and a patent cystic duct.  Sphincterotomy followed by   balloon bile duct sweeps were performed.  Patient is to undergo laparoscopic   cholecystectomy tomorrow.       ____________________________________     VESTA WANG DO    PIS / NTS    DD:  03/20/2017 16:46:50  DT:  03/20/2017 19:04:36    D#:  [id number]  Job#:  [id number]

## 2017-03-21 NOTE — PROGRESS NOTES
Gastroenterology Progress Note     Author: Heri Fernandes   Date & Time Created: 3/21/2017 4:08 PM    Interval History:  In OR for lap belén.    Review of Systems:  ROS    Physical Exam:  Physical Exam   Nursing note and vitals reviewed.      Labs:        Invalid input(s): [id number]      Recent Labs      17   SODIUM  142  138  141   POTASSIUM  4.1  3.2*  3.4*   CHLORIDE  111  111  112   CO2  25  21  24   BUN  20  6*  5*   CREATININE  0.87  0.69  0.73   MAGNESIUM   --    --   1.7   CALCIUM  8.1*  8.1*  8.0*     Recent Labs      17   ALTSGPT  155*  90*  79*   ASTSGOT  112*  38  40   ALKPHOSPHAT  77  64  77   TBILIRUBIN  0.7  0.6  0.5   LIPASE  >400*  51   --    GLUCOSE  94  110*  110*     Recent Labs      17   RBC  4.05*  3.94*  3.90*   HEMOGLOBIN  12.2  11.5*  11.6*   HEMATOCRIT  36.0*  35.1*  33.7*   PLATELETCT  191  174  183   PROTHROMBTM  14.0   --    --    INR  1.10   --    --      Recent Labs      17   WBC  9.8  8.9  8.6   NEUTSPOLYS  68.80  55.10  62.90   LYMPHOCYTES  24.00  34.80  27.10   MONOCYTES  6.10  6.90  7.10   EOSINOPHILS  0.50  2.30  2.30   BASOPHILS  0.20  0.60  0.30   ASTSGOT  112*  38  40   ALTSGPT  155*  90*  79*   ALKPHOSPHAT  77  64  77   TBILIRUBIN  0.7  0.6  0.5     Hemodynamics:  Temp (24hrs), Av.7 °C (98.1 °F), Min:36.2 °C (97.1 °F), Max:37.1 °C (98.8 °F)  Temperature: 36.9 °C (98.4 °F)  Pulse  Av.1  Min: 51  Max: 94Heart Rate (Monitored): 77  Blood Pressure : 147/63 mmHg     Respiratory:    Respiration: 16, Pulse Oximetry: 94 %        RUL Breath Sounds: Clear, RML Breath Sounds: Clear, RLL Breath Sounds: Diminished, ALEA Breath Sounds: Clear, LLL Breath Sounds: Diminished  Fluids:    Intake/Output Summary (Last 24 hours) at 17 1608  Last data filed at 17 1500   Gross per 24  hour   Intake   3100 ml   Output   2070 ml   Net   1030 ml        GI/Nutrition:  Orders Placed This Encounter   Procedures   • DIET ORDER     Standing Status: Standing      Number of Occurrences: 1      Standing Expiration Date:      Order Specific Question:  Diet:     Answer:  Clear Liquid [10]     Medical Decision Making, by Problem:  Active Hospital Problems    Diagnosis   • *Gallstone pancreatitis: Resolving   • Choledocholithiasis: S/P ERCP w/ stone extraction  Cholelithiasis       Plan:  Will sign off.     Labs reviewed, Medications reviewed and Radiology images reviewed

## 2017-03-21 NOTE — CARE PLAN
Problem: Infection  Goal: Will remain free from infection  Schedule IV abx's given - see MAR     Problem: Pain Management  Goal: Pain level will decrease to patient’s comfort goal  Pt denies the need for PRN pain medications

## 2017-03-21 NOTE — PROGRESS NOTES
Pt awake and alert.dtr.at bedside interprets for mother,who speaks Estonian.  Took bp meds with a sip,of water at 0835.otherwise npo  Iv infusing.  Voided in the br and also had a bm.  Denies pain.awaiting surgery today

## 2017-03-21 NOTE — OP REPORT
DATE OF SERVICE:  03/21/2017    PREOPERATIVE DIAGNOSES:  Cholelithiasis, cholecystitis and gallstone   pancreatitis.    POSTOPERATIVE DIAGNOSES:  Cholelithiasis, cholecystitis and gallstone   pancreatitis.    PROCEDURE:  Laparoscopic cholecystectomy.    SURGEON:  Chrissie Patel MD.    ASSISTANT:  Anna Carnes PA-C.    SECOND ASSISTANT:  Lauren Andres MS-IV.    ANESTHESIA:  Endotracheal.    ANESTHESIOLOGIST:  John Loza MD.    INDICATIONS:  The patient is an 85-year-old woman who presented with a one-day   history of severe abdominal pain where she was found to have _____   cholelithiasis and choledocholithiasis also gallstone pancreatitis.  She has   had an ERCP, which _____.  She is now being brought to the operating suite for   laparoscopic cholecystectomy.    FINDINGS:  Evidence of chronic cholecystitis, single duct, single artery   identified.    DESCRIPTION OF PROCEDURE:  After the patient was identified and consented, she   was brought to the operating room and placed in supine position.  Patient   underwent general endotracheal anesthetic.  Patient's abdomen was prepped and   draped in sterile fashion.  Periumbilical area was anesthetized with 0.25%   Marcaine.  A 1-cm incision was made.  The abdominal wall was lifted up and   Veress needle was introduced into the abdominal cavity.  After positive drop   test, pneumoperitoneum was obtained.  The Veress needle was removed.  A 5-mm   trocar was placed.  Under laparoscopic guidance, a 5 mm trocar was placed in   epigastric position and two 5 mm trocars were placed in right subcostal   position.  The gallbladder was lifted up, adhesions taken down.  The duct,   artery, and surrounding tissues doubly clipped and transected.  The   gallbladder was excised from the liver bed using electrocautery and was   brought through the epigastric port.  Liver bed was irrigated and hemostasis   secured.  Port sites removed and pneumoperitoneum released.  All  port sites   were closed with interrupted 4-0 Vicryls.  Op-Site was placed on the wounds.    Patient was extubated and taken to recovery in stable condition.  All sponge   and needle counts were correct.       ____________________________________     MD DON STUART / ALBERTO    DD:  03/21/2017 12:07:30  DT:  03/21/2017 14:18:21    D#:  [id number]  Job#:  [id number]    cc: DANNY SAPP MD, John Loza MD, VESTA WANG DO

## 2017-03-22 VITALS
DIASTOLIC BLOOD PRESSURE: 60 MMHG | SYSTOLIC BLOOD PRESSURE: 140 MMHG | HEART RATE: 78 BPM | RESPIRATION RATE: 16 BRPM | HEIGHT: 58 IN | TEMPERATURE: 98 F | WEIGHT: 126.98 LBS | OXYGEN SATURATION: 99 % | BODY MASS INDEX: 26.66 KG/M2

## 2017-03-22 LAB
ANION GAP SERPL CALC-SCNC: 4 MMOL/L (ref 0–11.9)
BUN SERPL-MCNC: 6 MG/DL (ref 8–22)
CALCIUM SERPL-MCNC: 8 MG/DL (ref 8.4–10.2)
CHLORIDE SERPL-SCNC: 109 MMOL/L (ref 96–112)
CO2 SERPL-SCNC: 24 MMOL/L (ref 20–33)
CREAT SERPL-MCNC: 0.72 MG/DL (ref 0.5–1.4)
EKG IMPRESSION: NORMAL
GFR SERPL CREATININE-BSD FRML MDRD: >60 ML/MIN/1.73 M 2
GLUCOSE SERPL-MCNC: 140 MG/DL (ref 65–99)
MAGNESIUM SERPL-MCNC: 1.6 MG/DL (ref 1.5–2.5)
POTASSIUM SERPL-SCNC: 3.3 MMOL/L (ref 3.6–5.5)
SODIUM SERPL-SCNC: 137 MMOL/L (ref 135–145)

## 2017-03-22 PROCEDURE — 700111 HCHG RX REV CODE 636 W/ 250 OVERRIDE (IP): Performed by: INTERNAL MEDICINE

## 2017-03-22 PROCEDURE — 99239 HOSP IP/OBS DSCHRG MGMT >30: CPT | Performed by: INTERNAL MEDICINE

## 2017-03-22 PROCEDURE — 80048 BASIC METABOLIC PNL TOTAL CA: CPT

## 2017-03-22 PROCEDURE — 700101 HCHG RX REV CODE 250: Performed by: INTERNAL MEDICINE

## 2017-03-22 PROCEDURE — A9270 NON-COVERED ITEM OR SERVICE: HCPCS | Performed by: INTERNAL MEDICINE

## 2017-03-22 PROCEDURE — 36415 COLL VENOUS BLD VENIPUNCTURE: CPT

## 2017-03-22 PROCEDURE — 700102 HCHG RX REV CODE 250 W/ 637 OVERRIDE(OP): Performed by: INTERNAL MEDICINE

## 2017-03-22 PROCEDURE — 83735 ASSAY OF MAGNESIUM: CPT

## 2017-03-22 RX ORDER — ONDANSETRON 4 MG/1
4 TABLET, ORALLY DISINTEGRATING ORAL EVERY 4 HOURS PRN
Qty: 10 TAB | Refills: 0 | Status: SHIPPED | OUTPATIENT
Start: 2017-03-22

## 2017-03-22 RX ORDER — OXYCODONE HYDROCHLORIDE 5 MG/1
5 TABLET ORAL EVERY 6 HOURS PRN
Qty: 20 TAB | Refills: 0 | Status: SHIPPED | OUTPATIENT
Start: 2017-03-22

## 2017-03-22 RX ORDER — POTASSIUM CHLORIDE 750 MG/1
30 TABLET, FILM COATED, EXTENDED RELEASE ORAL ONCE
Status: COMPLETED | OUTPATIENT
Start: 2017-03-22 | End: 2017-03-22

## 2017-03-22 RX ADMIN — HYDROCHLOROTHIAZIDE 12.5 MG: 25 TABLET ORAL at 08:53

## 2017-03-22 RX ADMIN — POTASSIUM CHLORIDE, DEXTROSE MONOHYDRATE AND SODIUM CHLORIDE: 150; 5; 450 INJECTION, SOLUTION INTRAVENOUS at 02:00

## 2017-03-22 RX ADMIN — METOPROLOL TARTRATE 12.5 MG: 25 TABLET ORAL at 08:54

## 2017-03-22 RX ADMIN — FEXOFENADINE HYDROCHLORIDE 180 MG: 60 TABLET, FILM COATED ORAL at 08:53

## 2017-03-22 RX ADMIN — CIPROFLOXACIN 400 MG: 2 INJECTION, SOLUTION INTRAVENOUS at 08:56

## 2017-03-22 RX ADMIN — POTASSIUM CHLORIDE 30 MEQ: 750 TABLET, FILM COATED, EXTENDED RELEASE ORAL at 10:48

## 2017-03-22 RX ADMIN — METRONIDAZOLE 500 MG: 500 INJECTION, SOLUTION INTRAVENOUS at 05:44

## 2017-03-22 RX ADMIN — OMEPRAZOLE 20 MG: 20 CAPSULE, DELAYED RELEASE ORAL at 08:55

## 2017-03-22 RX ADMIN — LOSARTAN POTASSIUM 50 MG: 25 TABLET, FILM COATED ORAL at 08:54

## 2017-03-22 ASSESSMENT — ENCOUNTER SYMPTOMS
FEVER: 0
HEADACHES: 0
COUGH: 0
NAUSEA: 0
HEARTBURN: 0
DEPRESSION: 0
DIZZINESS: 0
MYALGIAS: 0
BLURRED VISION: 0
VOMITING: 0

## 2017-03-22 ASSESSMENT — PAIN SCALES - GENERAL: PAINLEVEL_OUTOF10: 0

## 2017-03-22 NOTE — PROGRESS NOTES
Hospital Medicine Progress Note, Adult, Complex               Author: Pavan Ascencio Date & Time created: 3/22/2017  9:24 AM     Interval History:  Doing well, no pain/n/v and tolerated clears.      Review of Systems:  Review of Systems   Constitutional: Negative for fever.   Eyes: Negative for blurred vision.   Respiratory: Negative for cough.    Cardiovascular: Negative for chest pain.   Gastrointestinal: Negative for heartburn, nausea and vomiting.   Genitourinary: Negative for dysuria.   Musculoskeletal: Negative for myalgias.   Skin: Negative for rash.   Neurological: Negative for dizziness and headaches.   Psychiatric/Behavioral: Negative for depression.       Physical Exam:  Physical Exam   Constitutional: She is oriented to person, place, and time. No distress.   HENT:   Head: Normocephalic.   Eyes: EOM are normal.   Neck: Neck supple.   Cardiovascular: Normal rate and regular rhythm.    Pulmonary/Chest: Effort normal and breath sounds normal.   Abdominal: Soft. Bowel sounds are normal. She exhibits no distension. There is no tenderness.   Musculoskeletal: Normal range of motion. She exhibits no edema.   Neurological: She is alert and oriented to person, place, and time.   Skin: Skin is warm.   Psychiatric: Her behavior is normal.       Labs:        Invalid input(s): [id number]      Recent Labs      03/20/17 0528 03/21/17 0418 03/22/17 0444   SODIUM  138  141  137   POTASSIUM  3.2*  3.4*  3.3*   CHLORIDE  111  112  109   CO2  21  24  24   BUN  6*  5*  6*   CREATININE  0.69  0.73  0.72   MAGNESIUM   --   1.7  1.6   CALCIUM  8.1*  8.0*  8.0*     Recent Labs      03/20/17 0528 03/21/17 0418 03/22/17 0444   ALTSGPT  90*  79*   --    ASTSGOT  38  40   --    ALKPHOSPHAT  64  77   --    TBILIRUBIN  0.6  0.5   --    LIPASE  51   --    --    GLUCOSE  110*  110*  140*     Recent Labs      03/20/17 0528 03/21/17 0418   RBC  3.94*  3.90*   HEMOGLOBIN  11.5*  11.6*   HEMATOCRIT  35.1*  33.7*    PLATELETCT  174  183     Recent Labs      17   0528  17   0418   WBC  8.9  8.6   NEUTSPOLYS  55.10  62.90   LYMPHOCYTES  34.80  27.10   MONOCYTES  6.90  7.10   EOSINOPHILS  2.30  2.30   BASOPHILS  0.60  0.30   ASTSGOT  38  40   ALTSGPT  90*  79*   ALKPHOSPHAT  64  77   TBILIRUBIN  0.6  0.5           Hemodynamics:  Temp (24hrs), Av.7 °C (98.1 °F), Min:36.6 °C (97.8 °F), Max:36.9 °C (98.4 °F)  Temperature: 36.7 °C (98 °F)  Pulse  Av.4  Min: 51  Max: 94Heart Rate (Monitored): 77  Blood Pressure : 140/60 mmHg     Respiratory:    Respiration: 16, Pulse Oximetry: 99 %     Work Of Breathing / Effort: Mild  RUL Breath Sounds: Clear, RML Breath Sounds: Clear, RLL Breath Sounds: Diminished, ALEA Breath Sounds: Clear, LLL Breath Sounds: Diminished  Fluids:    Intake/Output Summary (Last 24 hours) at 17 0924  Last data filed at 17 0900   Gross per 24 hour   Intake   2900 ml   Output   3870 ml   Net   -970 ml        GI/Nutrition:  Orders Placed This Encounter   Procedures   • DIET ORDER     Standing Status: Standing      Number of Occurrences: 1      Standing Expiration Date:      Order Specific Question:  Diet:     Answer:  Clear Liquid [10]     Medical Decision Making, by Problem:  Active Hospital Problems    Diagnosis   • *Gallstone pancreatitis [K85.10] s/p ERCP and lap belén:  Stable and can go home today. D/w daughter at bedside.   • Cholecystitis [K81.9]   • Choledocholithiasis [K80.50]   • HTN (hypertension) [I10] controlled; cont current rx.       Core Measures

## 2017-03-22 NOTE — PROGRESS NOTES
Discharge orders received. All lines and monitors discontinued. Reviewed discharge paperwork with patient and daughter. Discussed diet, activity, medications, follow up care and worsening symptoms. No questions at this time. Pt to be discharged to home via private vehicle. Left unit in wheelchair with hospital escort.

## 2017-03-22 NOTE — DISCHARGE INSTRUCTIONS
Discharge Instructions    Discharged to home by car with relative. Discharged via wheelchair, hospital escort: Yes.  Special equipment needed: Not Applicable    Be sure to schedule a follow-up appointment with your primary care doctor or any specialists as instructed.     Discharge Plan:   Influenza Vaccine Indication: Not indicated: Previously immunized this influenza season and > 8 years of age    I understand that a diet low in cholesterol, fat, and sodium is recommended for good health. Unless I have been given specific instructions below for another diet, I accept this instruction as my diet prescription.     Special Instructions:     Laparoscopic Cholecystectomy discharge instructions:     DIET: Upon discharge from the hospital you may resume your normal preoperative diet. Depending on how you are feeling and whether you have nausea or not, you may wish to stay with a bland diet for the first few days. However, you can advance this as quickly as you feel ready.     ACTIVITIES: After discharge from the hospital, you may resume full routine activities. However, there should be no heavy lifting (greater than 15 pounds) and no strenuous activities until after your follow-up visit. Otherwise, routine activities of daily living are acceptable.     DRIVING: You may drive whenever you are off pain medications and are able to perform the activities needed to drive, i.e. turning, bending, twisting, etc.     BATHING: You may get the wound wet at any time after leaving the hospital. You may shower, but do not submerge in a bath for at least a week. Dressings may come off after 48 hours.     BOWEL FUNCTION: A few patients, after this operation, will develop either frequent or loose stools after meals. This usually corrects itself after a few days, to a few weeks. If this occurs, do not worry; it is not unusual and will resolve. Much more common than loose stools, is constipation. The combination of pain medication and  decreased activity level can cause constipation in otherwise normal patients. If you feel this is occurring, take a laxative (Milk of Magnesia, Ex-Lax, Senokot, etc.) until the problem has resolved.     PAIN MEDICATION: You will be given a prescription for pain medication at discharge. Please take these as directed. It is important to remember not to take medications on an empty stomach as this may cause nausea.     CALL IF YOU HAVE: (1) Fevers to more than 1010 F, (2) Unusual chest or leg pain, (3) Drainage or fluid from incision that may be foul smelling, increased tenderness or soreness at the wound or the wound edges are no longer together, redness or swelling at the incision site. Please do not hesitate to call with any other questions.     APPOINTMENT: Contact our office at [phone] for a follow-up appointment in 1 to 2 weeks following your procedure.     If you have any additional questions, please do not hesitate to call the office.     Office address:   [address] [place] [address], [address] [place] [address]    · Is patient discharged on Warfarin / Coumadin?   No     · Is patient Post Blood Transfusion?  No    Depression / Suicide Risk    As you are discharged from this [facility] facility, it is important to learn how to keep safe from harming yourself.    Recognize the warning signs:  · Abrupt changes in personality, positive or negative- including increase in energy   · Giving away possessions  · Change in eating patterns- significant weight changes-  positive or negative  · Change in sleeping patterns- unable to sleep or sleeping all the time   · Unwillingness or inability to communicate  · Depression  · Unusual sadness, discouragement and loneliness  · Talk of wanting to die  · Neglect of personal appearance   · Rebelliousness- reckless behavior  · Withdrawal from people/activities they love  · Confusion- inability to concentrate     If you or a loved one observes any of these behaviors or has concerns about  self-harm, here's what you can do:  · Talk about it- your feelings and reasons for harming yourself  · Remove any means that you might use to hurt yourself (examples: pills, rope, extension cords, firearm)  · Get professional help from the community (Mental Health, Substance Abuse, psychological counseling)  · Do not be alone:Call your Safe Contact- someone whom you trust who will be there for you.  · Call your local CRISIS HOTLINE 826-9137 or 949-302-8892  · Call your local Children's Mobile Crisis Response Team [place] (450) 032-2818 or www.5minutes  · Call the toll free National Suicide Prevention Hotlines   · National Suicide Prevention Lifeline 835-913-PZYL (7164)  · National Hope Line Network 800-SUICIDE (748-8499)

## 2017-03-22 NOTE — PROGRESS NOTES
Received Bedside report. Assumed care at 0715. This pt is AOx4, ambulatory with one assist, no N/V this morning, voiding, declines pain. Patient and RN discussed plan of care including IV abx and likely dc home today: questions answered. Labs noted, assessment complete, patient tolerating clear liquid diet. Call light in place, fall precautions in place, patient educated on importance of calling for assistance. No additional needs at this time. VSS

## 2017-03-22 NOTE — CARE PLAN
Problem: Infection  Goal: Will remain free from infection  Scheduled IV abx's given - see MAR     Problem: Pain Management  Goal: Pain level will decrease to patient’s comfort goal  Pt denies the need for any PRN pain medication this shift

## 2017-03-23 NOTE — DISCHARGE SUMMARY
DISCHARGE DIAGNOSES:  1.  Gallstone pancreatitis, status post endoscopic retrograde   cholangiopancreatography and then laparoscopic cholecystectomy.  2.  Choledocholithiasis with cholecystitis status post treatment above.  3.  Hypertension.    CONDITION:  Stable.    ACTIVITY:  As tolerated.    DIET:  Low fat.    DISCHARGE MEDICATIONS:  Resume these medication at home including; Allegra 180   mg daily, esomeprazole 20 mg daily, ferrous sulfate 325 mg daily, Lasix 20 mg   every other day, losartan/HCT 1 p.o. daily, metoprolol 12.5 mg b.i.d.  New   medications include ondansetron 4 mg p.o. q. 4 hours p.r.n. nausea and   vomiting and oxycodone 5 mg 1 p.o. q. 6 hours p.r.n. pain.    FOLLOWUP:  Follow up with Dr. Chrissie Patel in 1 week and PCP in 1 week.    HOSPITAL COURSE:  This is an 85-year-old [place]-American female with a   history of hypertension and GERD, who presents complaining of abdominal pain   after eating.  In the ER, she had imaging study, which showed cholelithiasis   along with choledocholithiasis and dilated common bile duct.  Patient also has   acute pancreatitis with elevated lipase level.  The patient was admitted,   started on empiric IV antibiotics and kept n.p.o.  Pain control and   antiemetics were given.  Patient was seen by GI, Dr. Fernandes.  He   recommended ERCP and patient had the procedure done on 03/20/2017.    Postoperative diagnoses included dilated common bile duct and common hepatic   duct 1 cm.  A 3 mm distal common bile duct filling defect consistent with   stone.  Patent cystic duct.  The patient had an ERCP along with biliary   sphincterotomy and balloon common bile duct sweep.  The patient was then seen   by Dr. Chrissie Patel from general surgery who recommended laparoscopic   cholecystectomy and this was done on 03/21/2017.  Patient did well postop and   at the time of discharge, able to tolerate p.o. with no nausea, vomiting or   significant pain.  She has been ambulating  without problems.  Her hypertension   has been under control with current medications.  The patient will be   discharged home today and advised to follow up as above.       ____________________________________     MD DAI Munoz / ALBERTO    DD:  03/22/2017 09:35:04  DT:  03/22/2017 16:49:06    D#:  [id number]  Job#:  [id number]

## 2017-05-18 NOTE — ADDENDUM NOTE
Encounter addended by: Chrissie Patel M.D. on: 5/18/2017  1:04 PM<BR>     Documentation filed: Clinical Notes

## 2022-05-12 NOTE — PROGRESS NOTES
Hospital Medicine Progress Note, Adult, Complex               Author: Pavan Ascencio Date & Time created: 3/21/2017  4:21 PM     Interval History:  S/p lap belén earlier today; currently with mild-mod surg pain and some nausea;  No sob/cp.    Review of Systems:  Review of Systems   Constitutional: Negative for fever.   Eyes: Negative for blurred vision.   Respiratory: Negative for cough.    Cardiovascular: Negative for chest pain.   Gastrointestinal: Positive for nausea. Negative for heartburn.   Genitourinary: Negative for dysuria.   Musculoskeletal: Negative for myalgias.   Skin: Negative for rash.   Neurological: Negative for dizziness and headaches.   Psychiatric/Behavioral: Negative for depression.       Physical Exam:  Physical Exam   Constitutional: She is oriented to person, place, and time. She appears well-developed.   HENT:   Head: Normocephalic.   Eyes: EOM are normal.   Neck: Neck supple.   Cardiovascular: Normal rate and regular rhythm.    Pulmonary/Chest: Effort normal and breath sounds normal.   Abdominal: Soft. Bowel sounds are normal.   Mildly tender at surgical area, wounds clean   Musculoskeletal: She exhibits no edema.   Neurological: She is alert and oriented to person, place, and time.   Skin: Skin is warm and dry.   Psychiatric: Her behavior is normal.       Labs:        Invalid input(s): [id number]      Recent Labs      03/19/17 0416 03/20/17 0528 03/21/17 0418   SODIUM  142  138  141   POTASSIUM  4.1  3.2*  3.4*   CHLORIDE  111  111  112   CO2  25  21  24   BUN  20  6*  5*   CREATININE  0.87  0.69  0.73   MAGNESIUM   --    --   1.7   CALCIUM  8.1*  8.1*  8.0*     Recent Labs      03/19/17 0416 03/20/17 0528 03/21/17 0418   ALTSGPT  155*  90*  79*   ASTSGOT  112*  38  40   ALKPHOSPHAT  77  64  77   TBILIRUBIN  0.7  0.6  0.5   LIPASE  >400*  51   --    GLUCOSE  94  110*  110*     Recent Labs      03/19/17 0416 03/20/17 0528 03/21/17 0418   RBC  4.05*  3.94*  3.90*    HEMOGLOBIN  12.2  11.5*  11.6*   HEMATOCRIT  36.0*  35.1*  33.7*   PLATELETCT  191  174  183   PROTHROMBTM  14.0   --    --    INR  1.10   --    --      Recent Labs      17   0416  17   0528  17   0418   WBC  9.8  8.9  8.6   NEUTSPOLYS  68.80  55.10  62.90   LYMPHOCYTES  24.00  34.80  27.10   MONOCYTES  6.10  6.90  7.10   EOSINOPHILS  0.50  2.30  2.30   BASOPHILS  0.20  0.60  0.30   ASTSGOT  112*  38  40   ALTSGPT  155*  90*  79*   ALKPHOSPHAT  77  64  77   TBILIRUBIN  0.7  0.6  0.5           Hemodynamics:  Temp (24hrs), Av.7 °C (98.1 °F), Min:36.2 °C (97.1 °F), Max:37.1 °C (98.8 °F)  Temperature: 36.9 °C (98.4 °F)  Pulse  Av.1  Min: 51  Max: 94Heart Rate (Monitored): 77  Blood Pressure : 147/63 mmHg     Respiratory:    Respiration: 16, Pulse Oximetry: 94 %        RUL Breath Sounds: Clear, RML Breath Sounds: Clear, RLL Breath Sounds: Diminished, ALEA Breath Sounds: Clear, LLL Breath Sounds: Diminished  Fluids:    Intake/Output Summary (Last 24 hours) at 17 1621  Last data filed at 17 1500   Gross per 24 hour   Intake   3100 ml   Output   2070 ml   Net   1030 ml        GI/Nutrition:  Orders Placed This Encounter   Procedures   • DIET ORDER     Standing Status: Standing      Number of Occurrences: 1      Standing Expiration Date:      Order Specific Question:  Diet:     Answer:  Clear Liquid [10]     Medical Decision Making, by Problem:  Active Hospital Problems    Diagnosis   • *Gallstone pancreatitis [K85.10] better; s/p ERCP then lap belén today;  Clears and advance as tolerated; pain control.  Home tomorrow if doing well   • Cholecystitis [K81.9] as above   • Choledocholithiasis [K80.50] as above   • HTN (hypertension) [I10] cont rx and adjust meds as needed.       Core Measures       No change

## (undated) DEVICE — GOWN WARMING STANDARD FLEX - (30/CA)

## (undated) DEVICE — SYRINGE SAFETY 3 ML 18 GA X 1 1/2 BLUNT LL (100/BX 8BX/CA)

## (undated) DEVICE — SPONGE GAUZE NON-STERILE 4X4 - (2000/CA 10PK/CA)

## (undated) DEVICE — WATER IRRIGATION STERILE 1000ML (12EA/CA)

## (undated) DEVICE — SET EXTENSION WITH 2 PORTS (48EA/CA) ***PART #2C8610 IS A SUBSTITUTE*****

## (undated) DEVICE — GLOVE BIOGEL SZ 8 SURGICAL PF LTX - (50PR/BX 4BX/CA)

## (undated) DEVICE — KIT ANESTHESIA W/CIRCUIT & 3/LT BAG W/FILTER (20EA/CA)

## (undated) DEVICE — HUMID-VENT HEAT AND MOISTURE EXCHANGE- (50/BX)

## (undated) DEVICE — SUCTION INSTRUMENT YANKAUER BULBOUS TIP W/O VENT (50EA/CA)

## (undated) DEVICE — TUBING CLEARLINK DUO-VENT - C-FLO (48EA/CA)

## (undated) DEVICE — DRESSING TRANSPARENT FILM TEGADERM 2.375 X 2.75"  (100EA/BX)"

## (undated) DEVICE — APPLIER 5MM MED/LARGE CLIP - (3/BX)

## (undated) DEVICE — GOWN SURGEONS X-LARGE - DISP. (30/CA)

## (undated) DEVICE — BAG, SPONGE COUNT 50600

## (undated) DEVICE — SET SUCTION/IRRIGATION WITH DISPOSABLE TIP (6/CA )PART #0250-070-520 IS A SUB

## (undated) DEVICE — ELECTRODE 850 FOAM ADHESIVE - HYDROGEL RADIOTRNSPRNT (50/PK)

## (undated) DEVICE — SYRINGE SAFETY 10 ML 18 GA X 1 1/2 BLUNT LL (100/BX 4BX/CA)

## (undated) DEVICE — GLOVE, LITE (PAIR)

## (undated) DEVICE — CANISTER SUCTION RIGID RED 1500CC (40EA/CA)

## (undated) DEVICE — SODIUM CHL IRRIGATION 0.9% 1000ML (12EA/CA)

## (undated) DEVICE — BASIN EMESIS DISP. - (250/CA)

## (undated) DEVICE — TRAY SKIN SCRUB PVP WET (20EA/CA) PART #DYND70356 DISCONTINUED

## (undated) DEVICE — KIT ROOM DECONTAMINATION

## (undated) DEVICE — TUBE E-T HI-LO CUFF 6.5MM (10EA/BX)

## (undated) DEVICE — EXTRACTOR PRO XL 9-12 MM ABOVE

## (undated) DEVICE — KIT  I.V. START (100EA/CA)

## (undated) DEVICE — TRUETOME JAG 39 PRELOADED SPHINTERTOME

## (undated) DEVICE — SENSOR SPO2 ADULT LNCS ADTX (20/BX) ORDER ITEM #19593

## (undated) DEVICE — SUTURE GENERAL

## (undated) DEVICE — GLOVE 6.5 LF PF PROTEXIS (50PR/BX)

## (undated) DEVICE — BITE BLOCK ADULT 60FR (100EA/CA)

## (undated) DEVICE — Device

## (undated) DEVICE — LACTATED RINGERS INJ 1000 ML - (14EA/CA 60CA/PF)

## (undated) DEVICE — ELECTRODE 5MM LHK LAPSCP STERILE DISP- MEGADYNE  (5/CA)

## (undated) DEVICE — SYRINGE DISP. 50CC LS - (40/BX)

## (undated) DEVICE — MASK ANESTHESIA ADULT  - (100/CA)

## (undated) DEVICE — TUBE CONNECTING SUCTION - CLEAR PLASTIC STERILE 72 IN (50EA/CA)

## (undated) DEVICE — GLOVE BIOGEL SZ 7 SURGICAL PF LTX - (50PR/BX 4BX/CA)

## (undated) DEVICE — GLOVE BIOGEL SZ 6.5 SURGICAL PF LTX (50PR/BX 4BX/CA)

## (undated) DEVICE — SYRINGE SAFETY 5 ML 18 GA X 1-1/2 BLUNT LL (100/BX 4BX/CA)

## (undated) DEVICE — CANNULA W/ SUPPLY TUBING O2 - (50/CA)

## (undated) DEVICE — GLOVE SZ 7.5 LF PROTEXIS (50PR/BX)

## (undated) DEVICE — PROTECTOR ULNA NERVE - (36PR/CA)

## (undated) DEVICE — GLOVE BIOGEL ECLIPSE PF LATEX SIZE 8.5 (50PR/BX)

## (undated) DEVICE — ELECTRODE DUAL RETURN W/ CORD - (50/PK)

## (undated) DEVICE — TUBING INSUFFLATION - (10/BX)

## (undated) DEVICE — SUTURE 4-0 VICRYL PLUS FS-2 - 27 INCH (36/BX)

## (undated) DEVICE — SENSOR SPO2 NEO LNCS ADHESIVE (20/BX) SEE USER NOTES

## (undated) DEVICE — CANNULA W/SEAL 5X100 Z-THRE - ADED KII (12/BX)

## (undated) DEVICE — TROCAR 5X100 BLADED Z-THREAD - KII (6/BX)

## (undated) DEVICE — HEAD HOLDER JUNIOR/ADULT

## (undated) DEVICE — TUBE SUCTION YANKAUER  1/4 X 6FT (20EA/CA)"